# Patient Record
Sex: MALE | Race: WHITE | NOT HISPANIC OR LATINO
[De-identification: names, ages, dates, MRNs, and addresses within clinical notes are randomized per-mention and may not be internally consistent; named-entity substitution may affect disease eponyms.]

---

## 2016-01-16 RX ORDER — METOPROLOL TARTRATE 50 MG
1 TABLET ORAL
Qty: 0 | Refills: 0 | DISCHARGE
Start: 2016-01-16

## 2017-04-18 ENCOUNTER — APPOINTMENT (OUTPATIENT)
Dept: CARDIOTHORACIC SURGERY | Facility: CLINIC | Age: 67
End: 2017-04-18

## 2017-04-18 VITALS
SYSTOLIC BLOOD PRESSURE: 126 MMHG | DIASTOLIC BLOOD PRESSURE: 82 MMHG | HEIGHT: 71 IN | HEART RATE: 61 BPM | OXYGEN SATURATION: 98 %

## 2017-04-18 NOTE — COUNSELING
[Hygeine (Including Daily Shower)] : hygeine (including daily shower) [Importance of Regular Medical Follow-Up] : the importance of regular medical follow-up [No Heavy Lifting] : no heavy lifting (>15-20 lb. for 1 month or 25 lb. for 3 months from date of surgery) [Blood Pressure Control] : blood pressure control [Weight Management] : weight management [S/S of infection] : signs and symptoms of infection (and to whom it should be reported) [Progressive Ambulation/Activity] : progressive ambulation/activity [Medication/Vitamin/Herb/Food Interaction] : medication/vitamin/herb/food interaction [Stress Management] : stress management

## 2017-04-26 NOTE — CONSULT LETTER
[Dear  ___] : Dear  [unfilled], [Courtesy Letter:] : I had the pleasure of seeing your patient, [unfilled], in my office today. [Please see my note below.] : Please see my note below. [Consult Closing:] : Thank you very much for allowing me to participate in the care of this patient.  If you have any questions, please do not hesitate to contact me. [Sincerely,] : Sincerely, [FreeTextEntry2] : Dr.Jerry Garzon\[facility] [address]\[place] [address] [Tacho Giron MD] : Tacho Giron MD [Chief] : Chief [Cardiac Surgery at [facility]] : Cardiac Surgery at [facility]

## 2017-05-01 ENCOUNTER — APPOINTMENT (OUTPATIENT)
Dept: SURGERY | Facility: CLINIC | Age: 67
End: 2017-05-01

## 2017-05-01 VITALS
BODY MASS INDEX: 31.22 KG/M2 | DIASTOLIC BLOOD PRESSURE: 78 MMHG | HEIGHT: 71 IN | SYSTOLIC BLOOD PRESSURE: 127 MMHG | OXYGEN SATURATION: 98 % | WEIGHT: 223 LBS | RESPIRATION RATE: 16 BRPM | HEART RATE: 51 BPM | TEMPERATURE: 97.8 F

## 2017-05-01 DIAGNOSIS — Z84.89 FAMILY HISTORY OF OTHER SPECIFIED CONDITIONS: ICD-10-CM

## 2017-05-01 DIAGNOSIS — Z82.49 FAMILY HISTORY OF ISCHEMIC HEART DISEASE AND OTHER DISEASES OF THE CIRCULATORY SYSTEM: ICD-10-CM

## 2017-05-01 DIAGNOSIS — Z86.718 PERSONAL HISTORY OF OTHER VENOUS THROMBOSIS AND EMBOLISM: ICD-10-CM

## 2017-05-01 DIAGNOSIS — Z86.39 PERSONAL HISTORY OF OTHER ENDOCRINE, NUTRITIONAL AND METABOLIC DISEASE: ICD-10-CM

## 2017-05-15 ENCOUNTER — APPOINTMENT (OUTPATIENT)
Dept: SURGERY | Facility: CLINIC | Age: 67
End: 2017-05-15

## 2017-05-15 VITALS
SYSTOLIC BLOOD PRESSURE: 115 MMHG | TEMPERATURE: 98.3 F | OXYGEN SATURATION: 98 % | HEART RATE: 54 BPM | DIASTOLIC BLOOD PRESSURE: 71 MMHG

## 2017-08-21 ENCOUNTER — APPOINTMENT (OUTPATIENT)
Dept: SURGERY | Facility: CLINIC | Age: 67
End: 2017-08-21
Payer: MEDICARE

## 2017-08-21 VITALS
OXYGEN SATURATION: 98 % | TEMPERATURE: 99.1 F | BODY MASS INDEX: 31.92 KG/M2 | WEIGHT: 228 LBS | HEART RATE: 63 BPM | RESPIRATION RATE: 16 BRPM | SYSTOLIC BLOOD PRESSURE: 114 MMHG | HEIGHT: 71 IN | DIASTOLIC BLOOD PRESSURE: 65 MMHG

## 2017-08-21 PROCEDURE — 99213 OFFICE O/P EST LOW 20 MIN: CPT

## 2017-11-06 ENCOUNTER — APPOINTMENT (OUTPATIENT)
Dept: ORTHOPEDIC SURGERY | Facility: CLINIC | Age: 67
End: 2017-11-06
Payer: MEDICARE

## 2017-11-06 VITALS
HEART RATE: 67 BPM | WEIGHT: 228 LBS | BODY MASS INDEX: 31.92 KG/M2 | HEIGHT: 71 IN | SYSTOLIC BLOOD PRESSURE: 120 MMHG | DIASTOLIC BLOOD PRESSURE: 82 MMHG

## 2017-11-06 DIAGNOSIS — S66.902A UNSPECIFIED INJURY OF UNSPECIFIED MUSCLE, FASCIA AND TENDON AT WRIST AND HAND LEVEL, LEFT HAND, INITIAL ENCOUNTER: ICD-10-CM

## 2017-11-06 PROCEDURE — 73140 X-RAY EXAM OF FINGER(S): CPT | Mod: FA

## 2017-11-06 PROCEDURE — 99203 OFFICE O/P NEW LOW 30 MIN: CPT

## 2017-11-07 PROBLEM — S66.902A: Status: ACTIVE | Noted: 2017-11-06

## 2018-01-08 ENCOUNTER — APPOINTMENT (OUTPATIENT)
Dept: CARDIOLOGY | Facility: CLINIC | Age: 68
End: 2018-01-08
Payer: MEDICARE

## 2018-01-08 PROCEDURE — 99214 OFFICE O/P EST MOD 30 MIN: CPT

## 2018-01-08 PROCEDURE — 93000 ELECTROCARDIOGRAM COMPLETE: CPT

## 2018-02-12 ENCOUNTER — APPOINTMENT (OUTPATIENT)
Dept: SURGERY | Facility: CLINIC | Age: 68
End: 2018-02-12
Payer: MEDICARE

## 2018-02-12 VITALS
DIASTOLIC BLOOD PRESSURE: 82 MMHG | RESPIRATION RATE: 16 BRPM | OXYGEN SATURATION: 97 % | HEIGHT: 71 IN | WEIGHT: 228 LBS | SYSTOLIC BLOOD PRESSURE: 133 MMHG | HEART RATE: 60 BPM | TEMPERATURE: 97.8 F | BODY MASS INDEX: 31.92 KG/M2

## 2018-02-12 PROCEDURE — 99213 OFFICE O/P EST LOW 20 MIN: CPT

## 2018-02-26 ENCOUNTER — MEDICATION RENEWAL (OUTPATIENT)
Age: 68
End: 2018-02-26

## 2018-02-26 ENCOUNTER — RX RENEWAL (OUTPATIENT)
Age: 68
End: 2018-02-26

## 2018-04-23 ENCOUNTER — APPOINTMENT (OUTPATIENT)
Dept: CARDIOLOGY | Facility: CLINIC | Age: 68
End: 2018-04-23

## 2018-04-27 ENCOUNTER — APPOINTMENT (OUTPATIENT)
Dept: CARDIOLOGY | Facility: CLINIC | Age: 68
End: 2018-04-27

## 2018-05-14 ENCOUNTER — APPOINTMENT (OUTPATIENT)
Dept: CARDIOLOGY | Facility: CLINIC | Age: 68
End: 2018-05-14

## 2018-05-15 ENCOUNTER — APPOINTMENT (OUTPATIENT)
Dept: CARDIOLOGY | Facility: CLINIC | Age: 68
End: 2018-05-15
Payer: MEDICARE

## 2018-05-15 PROCEDURE — 93880 EXTRACRANIAL BILAT STUDY: CPT

## 2018-05-15 PROCEDURE — 93306 TTE W/DOPPLER COMPLETE: CPT

## 2018-05-21 ENCOUNTER — RECORD ABSTRACTING (OUTPATIENT)
Age: 68
End: 2018-05-21

## 2018-05-21 DIAGNOSIS — M17.10 UNILATERAL PRIMARY OSTEOARTHRITIS, UNSPECIFIED KNEE: ICD-10-CM

## 2018-05-21 DIAGNOSIS — S83.209A UNSPECIFIED TEAR OF UNSPECIFIED MENISCUS, CURRENT INJURY, UNSPECIFIED KNEE, INITIAL ENCOUNTER: ICD-10-CM

## 2018-05-21 DIAGNOSIS — Z87.898 PERSONAL HISTORY OF OTHER SPECIFIED CONDITIONS: ICD-10-CM

## 2018-05-21 DIAGNOSIS — Z86.79 PERSONAL HISTORY OF OTHER DISEASES OF THE CIRCULATORY SYSTEM: ICD-10-CM

## 2018-05-21 RX ORDER — BACILLUS COAGULANS/INULIN 1B-250 MG
CAPSULE ORAL
Refills: 0 | Status: ACTIVE | COMMUNITY

## 2018-05-21 RX ORDER — HYDROCODONE BITARTRATE AND ACETAMINOPHEN 7.5; 325 MG/1; MG/1
7.5-325 TABLET ORAL
Qty: 30 | Refills: 0 | Status: COMPLETED | COMMUNITY
Start: 2018-01-29

## 2018-05-21 RX ORDER — SILDENAFIL CITRATE 100 MG/1
100 TABLET, FILM COATED ORAL
Qty: 6 | Refills: 0 | Status: DISCONTINUED | COMMUNITY
Start: 2016-12-27 | End: 2018-05-21

## 2018-05-21 RX ORDER — METOPROLOL TARTRATE 100 MG/1
100 TABLET, FILM COATED ORAL
Refills: 0 | Status: DISCONTINUED | COMMUNITY
End: 2018-05-21

## 2018-05-23 ENCOUNTER — APPOINTMENT (OUTPATIENT)
Dept: CARDIOLOGY | Facility: CLINIC | Age: 68
End: 2018-05-23
Payer: MEDICARE

## 2018-05-23 VITALS
SYSTOLIC BLOOD PRESSURE: 129 MMHG | WEIGHT: 225 LBS | HEIGHT: 71 IN | DIASTOLIC BLOOD PRESSURE: 80 MMHG | BODY MASS INDEX: 31.5 KG/M2 | RESPIRATION RATE: 16 BRPM | HEART RATE: 64 BPM

## 2018-05-23 DIAGNOSIS — S53.22XS: ICD-10-CM

## 2018-05-23 PROCEDURE — 99214 OFFICE O/P EST MOD 30 MIN: CPT

## 2018-05-23 PROCEDURE — 93000 ELECTROCARDIOGRAM COMPLETE: CPT

## 2018-06-14 ENCOUNTER — RX RENEWAL (OUTPATIENT)
Age: 68
End: 2018-06-14

## 2018-08-13 ENCOUNTER — APPOINTMENT (OUTPATIENT)
Dept: SURGERY | Facility: CLINIC | Age: 68
End: 2018-08-13
Payer: MEDICARE

## 2018-08-13 VITALS
BODY MASS INDEX: 32.76 KG/M2 | HEIGHT: 71 IN | DIASTOLIC BLOOD PRESSURE: 84 MMHG | SYSTOLIC BLOOD PRESSURE: 132 MMHG | OXYGEN SATURATION: 96 % | TEMPERATURE: 97.9 F | WEIGHT: 234 LBS | HEART RATE: 57 BPM | RESPIRATION RATE: 16 BRPM

## 2018-08-13 PROCEDURE — 99214 OFFICE O/P EST MOD 30 MIN: CPT

## 2018-09-27 ENCOUNTER — APPOINTMENT (OUTPATIENT)
Dept: CARDIOLOGY | Facility: CLINIC | Age: 68
End: 2018-09-27
Payer: MEDICARE

## 2018-09-27 VITALS
SYSTOLIC BLOOD PRESSURE: 123 MMHG | HEART RATE: 72 BPM | BODY MASS INDEX: 32.2 KG/M2 | DIASTOLIC BLOOD PRESSURE: 80 MMHG | RESPIRATION RATE: 15 BRPM | HEIGHT: 71 IN | WEIGHT: 230 LBS

## 2018-09-27 PROCEDURE — 99214 OFFICE O/P EST MOD 30 MIN: CPT

## 2018-09-27 PROCEDURE — 93000 ELECTROCARDIOGRAM COMPLETE: CPT

## 2018-10-04 ENCOUNTER — APPOINTMENT (OUTPATIENT)
Dept: DERMATOLOGY | Facility: CLINIC | Age: 68
End: 2018-10-04
Payer: MEDICARE

## 2018-10-04 PROCEDURE — 99213 OFFICE O/P EST LOW 20 MIN: CPT

## 2018-12-11 ENCOUNTER — APPOINTMENT (OUTPATIENT)
Dept: CARDIOLOGY | Facility: CLINIC | Age: 68
End: 2018-12-11
Payer: MEDICARE

## 2018-12-11 PROCEDURE — 93015 CV STRESS TEST SUPVJ I&R: CPT

## 2018-12-11 PROCEDURE — A9500: CPT

## 2018-12-11 PROCEDURE — 78452 HT MUSCLE IMAGE SPECT MULT: CPT

## 2018-12-11 RX ORDER — REGADENOSON 0.08 MG/ML
0.4 INJECTION, SOLUTION INTRAVENOUS
Qty: 1 | Refills: 0 | Status: COMPLETED | OUTPATIENT
Start: 2018-12-11

## 2018-12-11 RX ADMIN — REGADENOSON 0 MG/5ML: 0.08 INJECTION, SOLUTION INTRAVENOUS at 00:00

## 2018-12-21 RX ORDER — KIT FOR THE PREPARATION OF TECHNETIUM TC99M SESTAMIBI 1 MG/5ML
INJECTION, POWDER, LYOPHILIZED, FOR SOLUTION PARENTERAL
Refills: 0 | Status: COMPLETED | OUTPATIENT
Start: 2018-12-21

## 2018-12-21 RX ADMIN — KIT FOR THE PREPARATION OF TECHNETIUM TC99M SESTAMIBI 0: 1 INJECTION, POWDER, LYOPHILIZED, FOR SOLUTION PARENTERAL at 00:00

## 2019-01-22 ENCOUNTER — APPOINTMENT (OUTPATIENT)
Dept: CARDIOLOGY | Facility: CLINIC | Age: 69
End: 2019-01-22
Payer: MEDICARE

## 2019-01-22 ENCOUNTER — NON-APPOINTMENT (OUTPATIENT)
Age: 69
End: 2019-01-22

## 2019-01-22 VITALS
RESPIRATION RATE: 14 BRPM | WEIGHT: 239 LBS | DIASTOLIC BLOOD PRESSURE: 76 MMHG | BODY MASS INDEX: 33.46 KG/M2 | SYSTOLIC BLOOD PRESSURE: 124 MMHG | HEIGHT: 71 IN

## 2019-01-22 PROCEDURE — 99214 OFFICE O/P EST MOD 30 MIN: CPT

## 2019-01-22 PROCEDURE — 93000 ELECTROCARDIOGRAM COMPLETE: CPT

## 2019-02-11 ENCOUNTER — APPOINTMENT (OUTPATIENT)
Dept: SURGERY | Facility: CLINIC | Age: 69
End: 2019-02-11
Payer: MEDICARE

## 2019-02-11 VITALS
DIASTOLIC BLOOD PRESSURE: 70 MMHG | WEIGHT: 241 LBS | OXYGEN SATURATION: 99 % | RESPIRATION RATE: 14 BRPM | TEMPERATURE: 98.7 F | HEART RATE: 67 BPM | BODY MASS INDEX: 33.74 KG/M2 | HEIGHT: 71 IN | SYSTOLIC BLOOD PRESSURE: 113 MMHG

## 2019-02-11 PROCEDURE — 99213 OFFICE O/P EST LOW 20 MIN: CPT

## 2019-02-11 NOTE — PHYSICAL EXAM
[JVD] : no jugular venous distention  [No Rash or Lesion] : No rash or lesion [Purpura] : no purpura  [Petechiae] : no petechiae [Skin Ulcer] : no ulcer [Skin Induration] : no induration [Alert] : alert [Oriented to Person] : oriented to person [Oriented to Place] : oriented to place [Oriented to Time] : oriented to time [Calm] : calm [de-identified] : non toxic, in no acute distress [de-identified] : NC/AT PERRL EOMI no scleral icterus [de-identified] : trachea midline with no gross masses  [de-identified] : no audible wheezing or stridor [de-identified] : soft, mildly obese with no localizing tenderness, no guarding, no rebound, no masses [de-identified] : FROM of all extremities, no gross angulation or deformity, there remains a 2 cm reducible subxiphoid incisional hernia that exhibits no growth since the last evaluation and it remains soft and non tender [de-identified] : sternotomy scar consistent with prior cardiac surgery [de-identified] : mood is calm

## 2019-02-11 NOTE — HISTORY OF PRESENT ILLNESS
[de-identified] : The patient returns to the office with no complaints of pain, no nausea, and no vomit.  The patient at this point has noted no significant change in the hernia.  He reports that it will pop out with exertion and reduce upon relaxation.

## 2019-02-11 NOTE — ASSESSMENT
[FreeTextEntry1] : The patient is a 68 year old obese male with a stable subxiphoid incisional hernia.  The patient at this time is asymptomatic and wishes to continue with conservative management.   The patient was educated about the signs and symptoms of hernia strangulation and that should this occur they should seek immediate MD evaluation.\par

## 2019-05-09 ENCOUNTER — RX RENEWAL (OUTPATIENT)
Age: 69
End: 2019-05-09

## 2019-05-28 ENCOUNTER — APPOINTMENT (OUTPATIENT)
Dept: CARDIOLOGY | Facility: CLINIC | Age: 69
End: 2019-05-28

## 2019-05-30 ENCOUNTER — APPOINTMENT (OUTPATIENT)
Dept: CARDIOLOGY | Facility: CLINIC | Age: 69
End: 2019-05-30
Payer: MEDICARE

## 2019-05-30 ENCOUNTER — NON-APPOINTMENT (OUTPATIENT)
Age: 69
End: 2019-05-30

## 2019-05-30 VITALS
HEIGHT: 71 IN | RESPIRATION RATE: 14 BRPM | HEART RATE: 58 BPM | DIASTOLIC BLOOD PRESSURE: 78 MMHG | WEIGHT: 238 LBS | SYSTOLIC BLOOD PRESSURE: 122 MMHG | BODY MASS INDEX: 33.32 KG/M2

## 2019-05-30 DIAGNOSIS — E66.9 OBESITY, UNSPECIFIED: ICD-10-CM

## 2019-05-30 PROCEDURE — 93000 ELECTROCARDIOGRAM COMPLETE: CPT

## 2019-05-30 PROCEDURE — 99214 OFFICE O/P EST MOD 30 MIN: CPT

## 2019-05-30 NOTE — HISTORY OF PRESENT ILLNESS
[FreeTextEntry1] : He continues to have additional somatic complaints such as chronic arthritides of the knee, he continues to get additional "gel shots" which helps alleviate his knee pain.\par \par Patient is tolerating cardiac medications without negative side effects including Metoprolol Succinate 100 mg QD, Crestor 10 mg QHS, and ASA 81 mg QD.\par

## 2019-05-30 NOTE — DISCUSSION/SUMMARY
[FreeTextEntry1] : 1).  Patient is tolerating cardiac medications without negative side effects, continue with current cardiac medication regimen (refer above).\par \par 2).  Follow up with PCP (Dr. Calderon) regarding routine checkups and blood work, have copy faxed to our office. \par \par 3).  Counseled patient to continue with diet and lifestyle modification including low fat and low carbohydrate weight reducing diet.  Continue to implement aerobic exercise 4 to 5 days per week. \par \par 4).  No additional cardiac testing indicated at this time. \par \par 5).  Recommend patient report any untoward symptoms. \par \par 6).  Follow up with our office in 3 to 4 months or PRN.

## 2019-05-30 NOTE — PHYSICAL EXAM
[Normal Appearance] : normal appearance [General Appearance - In No Acute Distress] : no acute distress [Normal Conjunctiva] : the conjunctiva exhibited no abnormalities [Normal Oral Mucosa] : normal oral mucosa [Normal Jugular Venous A Waves Present] : normal jugular venous A waves present [Normal Jugular Venous V Waves Present] : normal jugular venous V waves present [No Jugular Venous Guzman A Waves] : no jugular venous guzman A waves [] : no respiratory distress [Respiration, Rhythm And Depth] : normal respiratory rhythm and effort [Auscultation Breath Sounds / Voice Sounds] : lungs were clear to auscultation bilaterally [Heart Rate And Rhythm] : heart rate and rhythm were normal [Heart Sounds] : normal S1 and S2 [Edema] : no peripheral edema present [FreeTextEntry1] : Grade I/VI to II/VI systolic murmur, well-healed surgical scar in midline  [Bowel Sounds] : normal bowel sounds [Abnormal Walk] : normal gait [Nail Clubbing] : no clubbing of the fingernails [Cyanosis, Localized] : no localized cyanosis [Skin Color & Pigmentation] : normal skin color and pigmentation [Skin Turgor] : normal skin turgor [Oriented To Time, Place, And Person] : oriented to person, place, and time [Impaired Insight] : insight and judgment were intact [Affect] : the affect was normal

## 2019-05-30 NOTE — ASSESSMENT
[FreeTextEntry1] : EKG 5/30/2019:  The EKG illustrates sinus bradycardia, rate of 58, slight left axis deviation.  Essentially unchanged.\par \par Most recent blood work (January 2019):  Cholesterol (127), LDL (50), HDL (48), Triglycerides (144).\par \par Most recent Nuclear Stress test (December 2018) demonstrated negative EKG for ischemia, normal myocardial perfusion imaging, normal LV function with an EF of 65%, prior extensive ischemia no longer seen when compared to previous testing.\par \par Most recent Transthoracic Echocardiogram (May 2018) illustrates normal LV function with an EF of 60 to 65%, mildly dilated ascending aorta, mild LVH, mild valvulopathy.

## 2019-05-30 NOTE — REASON FOR VISIT
[FreeTextEntry1] : The patient is a 68-year-old white male with known history for ischemic heart disease and prior history of multivessel CABG (January 2016), valvular insufficiency, and obesity. Mr. Hudson presents back to the office today for general cardiac checkup.  He reports feeling overall well and without cardiac complaints.  The patient denies CP, SOB, MOSELEY, PND, orthopnea, palpitations, presyncope, syncope.

## 2019-08-07 ENCOUNTER — RX RENEWAL (OUTPATIENT)
Age: 69
End: 2019-08-07

## 2019-08-12 ENCOUNTER — APPOINTMENT (OUTPATIENT)
Dept: SURGERY | Facility: CLINIC | Age: 69
End: 2019-08-12

## 2019-08-12 ENCOUNTER — EMERGENCY (EMERGENCY)
Facility: HOSPITAL | Age: 69
LOS: 0 days | Discharge: HOME | End: 2019-08-12
Attending: EMERGENCY MEDICINE
Payer: MEDICARE

## 2019-08-12 VITALS
RESPIRATION RATE: 16 BRPM | TEMPERATURE: 98 F | HEART RATE: 68 BPM | SYSTOLIC BLOOD PRESSURE: 118 MMHG | OXYGEN SATURATION: 97 % | DIASTOLIC BLOOD PRESSURE: 75 MMHG

## 2019-08-12 VITALS
RESPIRATION RATE: 16 BRPM | SYSTOLIC BLOOD PRESSURE: 117 MMHG | DIASTOLIC BLOOD PRESSURE: 76 MMHG | TEMPERATURE: 98 F | OXYGEN SATURATION: 100 % | HEART RATE: 72 BPM
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
## 2019-08-12 DIAGNOSIS — Z98.89 OTHER SPECIFIED POSTPROCEDURAL STATES: Chronic | ICD-10-CM

## 2019-08-12 DIAGNOSIS — S61.213A LACERATION WITHOUT FOREIGN BODY OF LEFT MIDDLE FINGER WITHOUT DAMAGE TO NAIL, INITIAL ENCOUNTER: ICD-10-CM
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
## 2019-08-12 DIAGNOSIS — Z86.711 PERSONAL HISTORY OF PULMONARY EMBOLISM: ICD-10-CM

## 2019-08-12 DIAGNOSIS — I10 ESSENTIAL (PRIMARY) HYPERTENSION: ICD-10-CM

## 2019-08-12 DIAGNOSIS — E03.9 HYPOTHYROIDISM, UNSPECIFIED: ICD-10-CM

## 2019-08-12 DIAGNOSIS — Z79.82 LONG TERM (CURRENT) USE OF ASPIRIN: ICD-10-CM

## 2019-08-12 PROCEDURE — 99284 EMERGENCY DEPT VISIT MOD MDM: CPT

## 2019-08-12 PROCEDURE — 13152 CMPLX RPR E/N/E/L 2.6-7.5 CM: CPT

## 2019-08-12 PROCEDURE — 99285 EMERGENCY DEPT VISIT HI MDM: CPT | Mod: 25

## 2019-08-12 PROCEDURE — 96374 THER/PROPH/DIAG INJ IV PUSH: CPT | Mod: XU

## 2019-08-12 RX ORDER — CEFAZOLIN SODIUM 1 G
2000 VIAL (EA) INJECTION ONCE
Refills: 0 | Status: COMPLETED | OUTPATIENT
Start: 2019-08-12 | End: 2019-08-12

## 2019-08-12 RX ADMIN — Medication 2000 MILLIGRAM(S): at 17:58

## 2019-08-12 NOTE — ED ADULT NURSE NOTE - OBJECTIVE STATEMENT
pt presents to the ED s/p laceration to right middle finger. States his hands were on a fence when a dog jumped up and his nail cut pt's left middle finger. Tetanus UTD. No other acute complaints at this time

## 2019-08-12 NOTE — ED STATDOCS - OBJECTIVE STATEMENT
69 y/o male with PMHx of HTN, PE, hypothyroid presents to the ED s/p laceration to right middle finger. States his hands were on a fence when a dog jumped up and his nail cut pt's left middle finger. Tetanus UTD. No other acute complaints at this time.

## 2019-08-12 NOTE — ED ADULT TRIAGE NOTE - CHIEF COMPLAINT QUOTE
Patient presents with dog scratch to finger, patient went to urgent care and was told to come to ER to meet Dr Ornelas. Patient is on Eliquis

## 2019-08-12 NOTE — ED STATDOCS - NSFOLLOWUPINSTRUCTIONS_ED_ALL_ED_FT
Laceration    WHAT YOU NEED TO KNOW:    A laceration is an injury to the skin and the soft tissue underneath it. Lacerations happen when you are cut or hit by something. They can happen anywhere on the body.     DISCHARGE INSTRUCTIONS:    Return to the emergency department if:     You have heavy bleeding or bleeding that does not stop after 10 minutes of holding firm, direct pressure over the wound.       Your wound opens up.     Contact your healthcare provider if:     You have a fever or chills.       Your laceration is red, warm, or swollen.      You have red streaks on your skin coming from your wound.      You have white or yellow drainage from the wound that smells bad.      You have pain that gets worse, even after treatment.       You have questions or concerns about your condition or care.     Medicines:     Prescription pain medicine may be given. Ask how to take this medicine safely.       Antibiotics help treat or prevent a bacterial infection.       Take your medicine as directed. Contact your healthcare provider if you think your medicine is not helping or if you have side effects. Tell him or her if you are allergic to any medicine. Keep a list of the medicines, vitamins, and herbs you take. Include the amounts, and when and why you take them. Bring the list or the pill bottles to follow-up visits. Carry your medicine list with you in case of an emergency.    Care for your wound as directed:     Do not get your wound wet until your healthcare provider says it is okay. Do not soak your wound in water. Do not go swimming until your healthcare provider says it is okay. Carefully wash the wound with soap and water. Gently pat the area dry or allow it to air dry.       Change your bandages when they get wet, dirty, or after washing. Apply new, clean bandages as directed. Do not apply elastic bandages or tape too tight. Do not put powders or lotions over your incision.       Apply antibiotic ointment as directed. Your healthcare provider may give you antibiotic ointment to put over your wound if you have stitches. If you have strips of tape over your incision, let them dry up and fall off on their own. If they do not fall off within 14 days, gently remove them. If you have glue over your wound, do not remove or pick at it. If your glue comes off, do not replace it with glue that you have at home.       Check your wound every day for signs of infection such as swelling, redness, or pus.     Self-care:     Apply ice on your wound for 15 to 20 minutes every hour or as directed. Use an ice pack, or put crushed ice in a plastic bag. Cover it with a towel. Ice helps prevent tissue damage and decreases swelling and pain.      Use a splint as directed. A splint will decrease movement and stress on your wound. It may help it heal faster. A splint may be used for lacerations over joints or areas of your body that bend. Ask your healthcare provider how to apply and remove a splint.       Decrease scarring of your wound by applying ointments as directed. Do not apply ointments until your healthcare provider says it is okay. You may need to wait until your wound is healed. Ask which ointment to buy and how often to use it. After your wound is healed, use sunscreen over the area when you are out in the sun. You should do this for at least 6 months to 1 year after your injury.     Follow up with your healthcare provider as directed: You may need to follow up in 24 to 48 hours to have your wound checked for infection. You will need to return in 3 to 14 days if you have stitches or staples so they can be removed. Care for your wound as directed to prevent infection and help it heal. Write down your questions so you remember to ask them during your visits.

## 2019-08-12 NOTE — ED STATDOCS - CARE PROVIDER_API CALL
Guero Carrillo)  Plastic Surgery  [address], [address], [place] [address]  Phone: [phone]  Fax: [phone]  Follow Up Time:

## 2019-08-12 NOTE — ED STATDOCS - PROGRESS NOTE DETAILS
YANIRA Esparza:   Patient has been seen, evaluated and orders have been written by the attending in intake. Patient is stable.  I will follow up the results of orders written and I will continue to evaluate/observe the patient.   Aiyana Esparza PA-C Pt reports R hand dominant.  Lac sustained while reaching over neighbor fence to set up ladder, dog's paw /nail scratched or ripped skin on left 3rd finger.  Denies bite wound.  Was repaired by Dr. Ornelas.  Augmentin rx was sent to pharmacy already by Urgent Care.  Pt will  and cont tonight.  Discussed elevating hand, Tylenol for pain in light of eliquis.  Td updated in .  F/U with Dr. ornelas.  will call office tomorrow for appt.  Aiyana Esparza PA-C

## 2019-08-12 NOTE — ED STATDOCS - CLINICAL SUMMARY MEDICAL DECISION MAKING FREE TEXT BOX
Pt with finger laceration.  Intact tendons on exam.  XR unremarkable.  Repaired in ED.  D/c home f/u for suture removal. Pt with finger laceration.  Intact tendons on exam.  XR unremarkable as outpatient.  Repaired in ED by Dr. Ornelas.  D/c home f/u for suture removal as scheduled.

## 2019-10-01 ENCOUNTER — NON-APPOINTMENT (OUTPATIENT)
Age: 69
End: 2019-10-01

## 2019-10-01 ENCOUNTER — APPOINTMENT (OUTPATIENT)
Dept: CARDIOLOGY | Facility: CLINIC | Age: 69
End: 2019-10-01
Payer: MEDICARE

## 2019-10-01 VITALS
DIASTOLIC BLOOD PRESSURE: 74 MMHG | HEART RATE: 58 BPM | SYSTOLIC BLOOD PRESSURE: 130 MMHG | WEIGHT: 240 LBS | BODY MASS INDEX: 33.6 KG/M2 | RESPIRATION RATE: 14 BRPM | HEIGHT: 71 IN

## 2019-10-01 DIAGNOSIS — Z86.79 PERSONAL HISTORY OF OTHER DISEASES OF THE CIRCULATORY SYSTEM: ICD-10-CM

## 2019-10-01 PROCEDURE — 93000 ELECTROCARDIOGRAM COMPLETE: CPT

## 2019-10-01 PROCEDURE — 99214 OFFICE O/P EST MOD 30 MIN: CPT

## 2019-10-01 RX ORDER — ASPIRIN 81 MG
81 TABLET, DELAYED RELEASE (ENTERIC COATED) ORAL DAILY
Refills: 0 | Status: DISCONTINUED | COMMUNITY
End: 2019-10-01

## 2019-10-01 NOTE — ASSESSMENT
[FreeTextEntry1] : EKG demonstrates normal sinus rhythm with a moderate rate of 58 and nonspecific T-wave flattening laterally\par \par In summary the patient is a 68-year-old gentleman with prior history of multivessel CABG, known peripheral venous insufficiency of the lower extremities (left greater than right) and now recent recurrent left leg DVT being managed by vascular surgery and currently on anticoagulation with improvement\par \par Plan:\par \par Patient likely will need long-term anticoagulation\par \par Recommend continue current medical regimen\par \par No additional cardiac workup indicated at this time;\par \par Followup to this office within 3-4 months or p.r.n.\par \par Timely checkups with primary care and encouraged;;;;;;

## 2019-10-01 NOTE — REASON FOR VISIT
[Follow-Up - Clinic] : a clinic follow-up of [FreeTextEntry1] : The patient is a 68-year-old white male with known history for coronary artery disease and prior history of multivessel CABG (2016) who presents back to the office for general cardiac checkup today\par \par Patient has been doing well from a cardiac standpoint however, unfortunately he was recently in the hospital for acute swelling of the left leg and found to have a large DVT in the left popliteal vein extending into the left calf;\par \par He was treated with anticoagulation with Eliquis 5 mg b.i.d. and followed by Dr. Zenon Do from the vascular surgical standpoint;;;

## 2019-10-01 NOTE — PHYSICAL EXAM
[General Appearance - Well Developed] : well developed [Normal Appearance] : normal appearance [Well Groomed] : well groomed [General Appearance - Well Nourished] : well nourished [No Deformities] : no deformities [General Appearance - In No Acute Distress] : no acute distress [Normal Conjunctiva] : the conjunctiva exhibited no abnormalities [Eyelids - No Xanthelasma] : the eyelids demonstrated no xanthelasmas [Normal Oral Mucosa] : normal oral mucosa [No Oral Pallor] : no oral pallor [No Oral Cyanosis] : no oral cyanosis [Normal Jugular Venous A Waves Present] : normal jugular venous A waves present [Normal Jugular Venous V Waves Present] : normal jugular venous V waves present [No Jugular Venous Guzman A Waves] : no jugular venous guzman A waves [Respiration, Rhythm And Depth] : normal respiratory rhythm and effort [Exaggerated Use Of Accessory Muscles For Inspiration] : no accessory muscle use [Auscultation Breath Sounds / Voice Sounds] : lungs were clear to auscultation bilaterally [Abdomen Soft] : soft [Abdomen Tenderness] : non-tender [Abdomen Mass (___ Cm)] : no abdominal mass palpated [Abnormal Walk] : normal gait [Gait - Sufficient For Exercise Testing] : the gait was sufficient for exercise testing [Nail Clubbing] : no clubbing of the fingernails [Cyanosis, Localized] : no localized cyanosis [FreeTextEntry1] : Left lower extremity swelling noted [Skin Color & Pigmentation] : normal skin color and pigmentation [] : no rash [No Venous Stasis] : no venous stasis [Skin Lesions] : no skin lesions [No Skin Ulcers] : no skin ulcer [No Xanthoma] : no  xanthoma was observed [Oriented To Time, Place, And Person] : oriented to person, place, and time [Affect] : the affect was normal [Mood] : the mood was normal [No Anxiety] : not feeling anxious

## 2020-01-31 ENCOUNTER — RX RENEWAL (OUTPATIENT)
Age: 70
End: 2020-01-31

## 2020-02-17 ENCOUNTER — NON-APPOINTMENT (OUTPATIENT)
Age: 70
End: 2020-02-17

## 2020-02-17 ENCOUNTER — APPOINTMENT (OUTPATIENT)
Dept: CARDIOLOGY | Facility: CLINIC | Age: 70
End: 2020-02-17
Payer: MEDICARE

## 2020-02-17 VITALS
SYSTOLIC BLOOD PRESSURE: 123 MMHG | BODY MASS INDEX: 34.02 KG/M2 | WEIGHT: 243 LBS | RESPIRATION RATE: 14 BRPM | HEIGHT: 71 IN | HEART RATE: 58 BPM | DIASTOLIC BLOOD PRESSURE: 74 MMHG

## 2020-02-17 DIAGNOSIS — Z87.19 PERSONAL HISTORY OF OTHER DISEASES OF THE DIGESTIVE SYSTEM: ICD-10-CM

## 2020-02-17 DIAGNOSIS — Z86.79 PERSONAL HISTORY OF OTHER DISEASES OF THE CIRCULATORY SYSTEM: ICD-10-CM

## 2020-02-17 PROCEDURE — 99214 OFFICE O/P EST MOD 30 MIN: CPT

## 2020-02-17 PROCEDURE — 93000 ELECTROCARDIOGRAM COMPLETE: CPT

## 2020-02-17 RX ORDER — APIXABAN 5 MG/1
5 TABLET, FILM COATED ORAL
Refills: 0 | Status: DISCONTINUED | COMMUNITY
End: 2020-02-17

## 2020-02-17 NOTE — HISTORY OF PRESENT ILLNESS
[FreeTextEntry1] : He is otherwise tolerating his current medication without difficulty.\par \par Last nuclear stress test was December 2018 and was negative for ischemia;\par \par

## 2020-02-17 NOTE — ASSESSMENT
[FreeTextEntry1] : EKG shows normal sinus rhythm at a rate of 58 with slight left axis deviation and otherwise no acute changes\par \par In summary the patient is a 69-year-old gentleman with known history for coronary disease and stable cardiac pattern at this time who has been on anticoagulation for history of DVT and remains on such although lower dose Eliquis;\par \par Plan:\par \par No additional cardiac workup indicated at this time;\par \par Followup to this office within 4 months or p.r.n.;\par \par Reviewed recent favorable lab results on lipid profile with improving triglycerides;\par \par Continue timely checkups and laboratory blood tests with primary care;;

## 2020-02-17 NOTE — REASON FOR VISIT
[Follow-Up - Clinic] : a clinic follow-up of [FreeTextEntry1] : The patient is a 69-year-old white male with a known history for multivessel CABG (2016) who presents back to the office today for general cardiac checkup;\par \par Patient reports that he had been on anticoagulation with Eliquis for history of DVT in the left leg and now had the medication tapered down to 2.5 mg b.i.d. for now (hematology-Dr. Sosa);\par \par Otherwise, he has been largely asymptomatic for chest pain, shortness of breath, palpitations or dizziness;

## 2020-02-17 NOTE — PHYSICAL EXAM
[General Appearance - Well Developed] : well developed [Normal Appearance] : normal appearance [Well Groomed] : well groomed [General Appearance - Well Nourished] : well nourished [No Deformities] : no deformities [General Appearance - In No Acute Distress] : no acute distress [Normal Conjunctiva] : the conjunctiva exhibited no abnormalities [Eyelids - No Xanthelasma] : the eyelids demonstrated no xanthelasmas [Normal Oral Mucosa] : normal oral mucosa [No Oral Pallor] : no oral pallor [No Oral Cyanosis] : no oral cyanosis [Normal Jugular Venous A Waves Present] : normal jugular venous A waves present [Normal Jugular Venous V Waves Present] : normal jugular venous V waves present [No Jugular Venous Guzman A Waves] : no jugular venous guzman A waves [Respiration, Rhythm And Depth] : normal respiratory rhythm and effort [Exaggerated Use Of Accessory Muscles For Inspiration] : no accessory muscle use [Auscultation Breath Sounds / Voice Sounds] : lungs were clear to auscultation bilaterally [Abdomen Soft] : soft [Abdomen Tenderness] : non-tender [Abdomen Mass (___ Cm)] : no abdominal mass palpated [Nail Clubbing] : no clubbing of the fingernails [Gait - Sufficient For Exercise Testing] : the gait was sufficient for exercise testing [Abnormal Walk] : normal gait [Cyanosis, Localized] : no localized cyanosis [FreeTextEntry1] : Left lower extremity swelling noted [Skin Color & Pigmentation] : normal skin color and pigmentation [] : no rash [No Venous Stasis] : no venous stasis [Skin Lesions] : no skin lesions [No Skin Ulcers] : no skin ulcer [No Xanthoma] : no  xanthoma was observed [Oriented To Time, Place, And Person] : oriented to person, place, and time [Affect] : the affect was normal [Mood] : the mood was normal [No Anxiety] : not feeling anxious

## 2020-04-17 ENCOUNTER — RX RENEWAL (OUTPATIENT)
Age: 70
End: 2020-04-17

## 2020-06-24 ENCOUNTER — NON-APPOINTMENT (OUTPATIENT)
Age: 70
End: 2020-06-24

## 2020-06-24 ENCOUNTER — APPOINTMENT (OUTPATIENT)
Dept: CARDIOLOGY | Facility: CLINIC | Age: 70
End: 2020-06-24
Payer: MEDICARE

## 2020-06-24 VITALS
SYSTOLIC BLOOD PRESSURE: 140 MMHG | HEIGHT: 71 IN | DIASTOLIC BLOOD PRESSURE: 70 MMHG | HEART RATE: 57 BPM | BODY MASS INDEX: 32.2 KG/M2 | RESPIRATION RATE: 12 BRPM | WEIGHT: 230 LBS | TEMPERATURE: 98 F

## 2020-06-24 PROCEDURE — 93000 ELECTROCARDIOGRAM COMPLETE: CPT

## 2020-06-24 PROCEDURE — 99215 OFFICE O/P EST HI 40 MIN: CPT

## 2020-06-24 RX ORDER — ROSUVASTATIN CALCIUM 10 MG/1
10 TABLET, FILM COATED ORAL
Qty: 90 | Refills: 3 | Status: DISCONTINUED | COMMUNITY
Start: 2018-05-23 | End: 2020-06-24

## 2020-06-24 NOTE — HISTORY OF PRESENT ILLNESS
[FreeTextEntry1] : He doesn't feel he has any overt anxiety but there is stress in the house at times;\par \par States he's been otherwise following safety guidelines during the corona virus pandemic;

## 2020-06-24 NOTE — REASON FOR VISIT
[Follow-Up - Clinic] : a clinic follow-up of [FreeTextEntry1] : The patient is a 69-year-old white male with known history of ischemic heart disease and prior history for multivessel CABG dating back to 2016;\par \par Patient presents in followup to the office today and reports that he's been noting occasional feelings of shortness of breath somehow mostly at rest and not even particularly exertional related;\par \par He is able to mow the lawn at his house and do his daughters yard and generally feel well;\par \par Yet he feels like sometimes he can't take a deep breath there has a slight heaviness in his chest and breathing;\par \par O2 saturations at home have been in the 90s without difficulty;\par \par He denies PND or orthopnea, palpitations or dizziness;

## 2020-06-24 NOTE — REVIEW OF SYSTEMS
[Shortness Of Breath] : shortness of breath [Chest  Pressure] : chest pressure [Negative] : Heme/Lymph

## 2020-06-24 NOTE — PHYSICAL EXAM
[General Appearance - Well Developed] : well developed [Normal Appearance] : normal appearance [Well Groomed] : well groomed [General Appearance - Well Nourished] : well nourished [No Deformities] : no deformities [Normal Conjunctiva] : the conjunctiva exhibited no abnormalities [General Appearance - In No Acute Distress] : no acute distress [Eyelids - No Xanthelasma] : the eyelids demonstrated no xanthelasmas [No Oral Pallor] : no oral pallor [Normal Oral Mucosa] : normal oral mucosa [Normal Jugular Venous A Waves Present] : normal jugular venous A waves present [No Oral Cyanosis] : no oral cyanosis [Normal Jugular Venous V Waves Present] : normal jugular venous V waves present [No Jugular Venous Guzman A Waves] : no jugular venous guzman A waves [Auscultation Breath Sounds / Voice Sounds] : lungs were clear to auscultation bilaterally [Respiration, Rhythm And Depth] : normal respiratory rhythm and effort [Exaggerated Use Of Accessory Muscles For Inspiration] : no accessory muscle use [Abdomen Soft] : soft [Abdomen Mass (___ Cm)] : no abdominal mass palpated [Abdomen Tenderness] : non-tender [Abnormal Walk] : normal gait [Gait - Sufficient For Exercise Testing] : the gait was sufficient for exercise testing [Nail Clubbing] : no clubbing of the fingernails [Cyanosis, Localized] : no localized cyanosis [FreeTextEntry1] : Left lower extremity swelling noted [Skin Color & Pigmentation] : normal skin color and pigmentation [No Venous Stasis] : no venous stasis [Skin Lesions] : no skin lesions [] : no rash [No Skin Ulcers] : no skin ulcer [No Xanthoma] : no  xanthoma was observed [Mood] : the mood was normal [Oriented To Time, Place, And Person] : oriented to person, place, and time [Affect] : the affect was normal [No Anxiety] : not feeling anxious

## 2020-06-24 NOTE — ASSESSMENT
[FreeTextEntry1] : EKG demonstrating sinus bradycardia at a rate of 57 with leftward axis deviation and otherwise no acute changes;\par \par In summary the patient is a 69-year-old gentleman with history for CAD and multivessel CABG with some recent feelings of shortness of breath and atypical chest heaviness. He has a known history of carotid plaquing stenosis;\par \par \par \par Plan:\par \par Recommend patient empirically decrease metoprolol succinate 250 mg p.o. daily;\par \par Schedule carotid duplex study and echocardiogram to assess carotid plaquing stenosis and cardiac function\par \par Recommend nuclear stress test with pharmacologic protocol to rule out any significant coronary ischemia in the near future\par \par Followup office visit within 3-4 months or p.r.n. as well;\par \par Continue other medications the same including low dose Eliquis which he has been taking since he had DVT several months ago--(to followup with vascular surgery and hematology as well);;

## 2020-07-21 ENCOUNTER — RX RENEWAL (OUTPATIENT)
Age: 70
End: 2020-07-21

## 2020-07-24 ENCOUNTER — APPOINTMENT (OUTPATIENT)
Dept: CARDIOLOGY | Facility: CLINIC | Age: 70
End: 2020-07-24
Payer: MEDICARE

## 2020-07-24 PROCEDURE — 78452 HT MUSCLE IMAGE SPECT MULT: CPT

## 2020-07-24 PROCEDURE — 93015 CV STRESS TEST SUPVJ I&R: CPT

## 2020-07-24 PROCEDURE — A9500: CPT

## 2020-07-24 RX ADMIN — REGADENOSON 0 MG/5ML: 0.08 INJECTION, SOLUTION INTRAVENOUS at 00:00

## 2020-07-24 RX ADMIN — AMINOPHYLLINE 0 MG/ML: 25 INJECTION, SOLUTION INTRAVENOUS at 00:00

## 2020-07-28 ENCOUNTER — APPOINTMENT (OUTPATIENT)
Dept: CARDIOLOGY | Facility: CLINIC | Age: 70
End: 2020-07-28
Payer: MEDICARE

## 2020-07-28 PROCEDURE — ZZZZZ: CPT

## 2020-07-29 RX ORDER — AMINOPHYLLINE 25 MG/ML
25 INJECTION, SOLUTION INTRAVENOUS
Qty: 0 | Refills: 0 | Status: COMPLETED | OUTPATIENT
Start: 2020-07-24

## 2020-07-29 RX ORDER — REGADENOSON 0.08 MG/ML
0.4 INJECTION, SOLUTION INTRAVENOUS
Qty: 4 | Refills: 0 | Status: COMPLETED | OUTPATIENT
Start: 2020-07-24

## 2020-08-12 ENCOUNTER — APPOINTMENT (OUTPATIENT)
Dept: CARDIOLOGY | Facility: CLINIC | Age: 70
End: 2020-08-12

## 2020-08-13 ENCOUNTER — NON-APPOINTMENT (OUTPATIENT)
Age: 70
End: 2020-08-13

## 2020-08-13 ENCOUNTER — APPOINTMENT (OUTPATIENT)
Dept: CARDIOLOGY | Facility: CLINIC | Age: 70
End: 2020-08-13
Payer: MEDICARE

## 2020-08-13 VITALS
HEIGHT: 71 IN | BODY MASS INDEX: 32.2 KG/M2 | HEART RATE: 65 BPM | SYSTOLIC BLOOD PRESSURE: 140 MMHG | TEMPERATURE: 97.9 F | DIASTOLIC BLOOD PRESSURE: 84 MMHG | WEIGHT: 230 LBS | RESPIRATION RATE: 14 BRPM

## 2020-08-13 DIAGNOSIS — R06.02 SHORTNESS OF BREATH: ICD-10-CM

## 2020-08-13 PROCEDURE — 99215 OFFICE O/P EST HI 40 MIN: CPT

## 2020-08-13 PROCEDURE — 93000 ELECTROCARDIOGRAM COMPLETE: CPT

## 2020-08-13 NOTE — REVIEW OF SYSTEMS
[Chest  Pressure] : chest pressure [Shortness Of Breath] : shortness of breath [Negative] : Heme/Lymph

## 2020-08-13 NOTE — ASSESSMENT
[FreeTextEntry1] : EKG 8/13/2020:  The EKG illustrates sinus rhythm, rate of 65 bpm, left atrial enlargement pattern, slight left axis deviation.  \par \par

## 2020-08-13 NOTE — DISCUSSION/SUMMARY
[FreeTextEntry1] : 1).  Patient continues to c/o some atypical "chest heaviness" and shortness of breath at rest. His most recent ischemic workup with a Nuclear Stress test demonstrating some maria luz-infarct ischemia and hypokinesis not previously seen;  will complete a Left Heart Catheterization in the near future to assess coronary perfusion with grafts.\par \par 2).  He will complete the previously ordered echocardiogram and carotid doppler in September to reassess cardiac function / valvulopathy and carotid plaquing respectively.\par \par 3).  Patient is tolerating cardiac medications without negative side effects, continue with current cardiac medication regimen (refer above).\par \par 4).  Immediately go to the emergency department and/or report any untoward symptoms to our office including worsening chest heaviness, shortness of breath, excessive sweating, dizziness.\par \par 5).  Follow up with PCP (Dr. Pederson) regarding routine checkups and blood work including fasting lipid panel, have copy faxed to our office. \par \par 6).  Follow up with Dr. Garzon on October 12th or PRN.

## 2020-08-13 NOTE — HISTORY OF PRESENT ILLNESS
[FreeTextEntry1] : He continues to report being able to mow the lawn at his house and do his daughters yard and generally feel well;\par \par Patient is tolerating cardiac medications without negative side effects including Eliquis 2.5 mg BID, Metoprolol Succinate 50 mg QD, Crestor 10 mg QHS.\par \par Most recent Pharmacologic Nuclear Stress test (7/24/2020):  Patient developed NO dysrhythmias and EKG was negative for ischemia.  The myocardial perfusion images were ABNORMAL with a small basal inferior wall infarct with mild maria luz-infarct ischemia.  There was moderate hypokinesis of the baasal inferior walls with preserved LV function;  LVEF of 66%.

## 2020-08-13 NOTE — REASON FOR VISIT
Chief Complaint   Patient presents with   • Annual Wellness Visit         HPI:  Pam is a 76 y.o. here for Medicare Annual Wellness Visit        Patient Active Problem List    Diagnosis Date Noted   • Coronary artery disease involving native coronary artery of native heart without angina pectoris 09/14/2017   • Postablative hypothyroidism 09/14/2017   • Hypercholesterolemia 09/14/2017   • Tobacco use disorder, mild, abuse 09/14/2017   • Prediabetes 09/14/2017   • Essential hypertension 09/14/2017       Current Outpatient Prescriptions   Medication Sig Dispense Refill   • atorvastatin (LIPITOR) 80 MG tablet TAKE 1 TABLET DAILY 90 Tab 3   • hydroCHLOROthiazide (HYDRODIURIL) 25 MG Tab TAKE 1 TABLET DAILY 90 Tab 3   • lisinopril (PRINIVIL) 10 MG Tab TAKE 1 TABLET DAILY 90 Tab 3   • metformin (GLUCOPHAGE) 1000 MG tablet TAKE 1 TABLET TWICE A DAY WITH MEALS 180 Tab 3   • levothyroxine (SYNTHROID) 100 MCG Tab Take 1 Tab by mouth Every morning on an empty stomach. 90 Tab 3   • aspirin (ASA) 81 MG Chew Tab chewable tablet Take 81 mg by mouth every day.       No current facility-administered medications for this visit.         Patient is taking medications as noted in medication list.  Current supplements as per medication list.     Allergies: Patient has no known allergies.    Current social contact/activities: patient reports shopping with friends,     Is patient current with immunizations? No, due for TDAP and SHINGRIX (Shingles). Patient is interested in receiving NONE today.    She  reports that she has been smoking.  She has been smoking about 0.25 packs per day. She has never used smokeless tobacco. She reports that she does not drink alcohol or use drugs.  Ready to quit: Not Answered  Counseling given: Not Answered        DPA/Advanced directive: Patient has Advanced Directive, but it is not on file. Instructed to bring in a copy to scan into their chart.    ROS:    Gait: Uses assistive device, cane   Ostomy: No    Other tubes: No   Amputations: No   Chronic oxygen use No   Last eye exam patient reports every 32 years   Wears hearing aids: No   : Denies any urinary leakage during the last 6 months      Depression Screening    Little interest or pleasure in doing things?  0 - not at all  Feeling down, depressed, or hopeless? 0 - not at all  Patient Health Questionnaire Score: 0    If depressive symptoms identified deferred to follow up visit unless specifically addressed in assessment and plan.    Interpretation of PHQ-9 Total Score   Score Severity   1-4 No Depression   5-9 Mild Depression   10-14 Moderate Depression   15-19 Moderately Severe Depression   20-27 Severe Depression    Screening for Cognitive Impairment    Three Minute Recall (village, kitchen, baby)  2/3    Anuj clock face with all 12 numbers and set the hands to show 10 past 10.  Yes 5/5  If cognitive concerns identified, deferred for follow up unless specifically addressed in assessment and plan.    Fall Risk Assessment    Has the patient had two or more falls in the last year or any fall with injury in the last year?  No  If fall risk identified, deferred for follow up unless specifically addressed in assessment and plan.    Safety Assessment    Throw rugs on floor.  Yes  Handrails on all stairs.  No  Good lighting in all hallways.  Yes  Difficulty hearing.  No  Patient counseled about all safety risks that were identified.    Functional Assessment ADLs    Are there any barriers preventing you from cooking for yourself or meeting nutritional needs?  No.    Are there any barriers preventing you from driving safely or obtaining transportation?  No.    Are there any barriers preventing you from using a telephone or calling for help?  No.    Are there any barriers preventing you from shopping?  No.    Are there any barriers preventing you from taking care of your own finances?  No.    Are there any barriers preventing you from managing your medications?  No.     Are there any barriers preventing you from showering, bathing or dressing yourself?  No.    Are you currently engaging in any exercise or physical activity?  Yes.  Walks daily  What is your perception of your health?  Good.    Health Maintenance Summary                Annual Wellness Visit Overdue 1942     IMM DTaP/Tdap/Td Vaccine Overdue 12/4/1961     IMM ZOSTER VACCINES Overdue 12/4/1992     COLON CANCER SCREENING ANNUAL FIT Next Due 1/24/2020      Done 1/24/2019 OCCULT BLOOD FECES IMMUNOASSAY     Patient has more history with this topic...          Patient Care Team:  Ryanne Leos M.D. as PCP - General (Family Medicine)    Social History   Substance Use Topics   • Smoking status: Current Some Day Smoker     Packs/day: 0.25   • Smokeless tobacco: Never Used   • Alcohol use No     Family History   Problem Relation Age of Onset   • Cancer Mother         lung   • Cancer Father         colon   • Lung Cancer Father    • Heart Disease Sister    • Diabetes Sister      She  has a past medical history of Coronary artery disease involving native coronary artery of native heart without angina pectoris (9/14/2017); Essential hypertension (9/14/2017); Hypercholesterolemia (9/14/2017); Postablative hypothyroidism (9/14/2017); Prediabetes (9/14/2017); and Tobacco use disorder, mild, abuse (9/14/2017).   Past Surgical History:   Procedure Laterality Date   • CORONARY ARTERY BYPASS, 3  11/2000   • HYSTERECTOMY, VAGINAL     • TONSILLECTOMY AND ADENOIDECTOMY             Exam:     There were no vitals taken for this visit. There is no height or weight on file to calculate BMI.    Hearing excellent.    Dentition good  Alert, oriented in no acute distress.  Eye contact is good, speech goal directed, affect calm      Assessment and Plan. The following treatment and monitoring plan is recommended:    Coronary artery disease involving native coronary artery of native heart without angina pectoris  This is a chronic health  problem that is well controlled with current medications and lifestyle measures.  Continues to follow with cardiology    Essential hypertension  This is a chronic health problem that is well controlled with current medications and lifestyle measures.  Her BP is perfect at 124/76. The patient denies chest pain, shortness of breath or dyspnea on exertion.      Hypercholesterolemia  This is a chronic health problem that is well controlled with current medications and lifestyle measures.      Postablative hypothyroidism  This is a chronic health problem that is well controlled with current medications and lifestyle measures.  Patient continues on her thyroid replacement with levothyroxine 100 mcg daily.    Prediabetes  This is a chronic health problem that is well controlled with current medications and lifestyle measures.  Will continue to follow with labs in 3 months.    Tobacco use disorder, mild, abuse  This is a chronic health problem that is well controlled with current medications and lifestyle measures.  She limits her smoking only to when she is at the casinos because she does not want to smoke around her .        Services suggested: No services needed at this time  Health Care Screening recommendations as per orders if indicated.  Referrals offered: PT/OT/Nutrition counseling/Behavioral Health/Smoking cessation as per orders if indicated.    Discussion today about general wellness and lifestyle habits:    · Prevent falls and reduce trip hazards; Cautioned about securing or removing rugs.  · Have a working fire alarm and carbon monoxide detector;   · Engage in regular physical activity and social activities       Follow-up: follow up in 3 months with labs    Addendum: This office visit and chart was completed by Dr.Marie SANDY Leos as part of her usual day.   [FreeTextEntry1] : The patient is a 69-year-old white male with known history of ischemic heart disease and prior history for multivessel CABG dating back to 2016 is here today to review most recent Nuclear Stress test results;\par \par Mr. Hudson continues to report occasional feelings of shortness of breath somehow mostly at rest and not even particularly exertional related.  There are still associated feelings of "chest heaviness" and difficulty taking a deep breath.\par \par He denies diaphoresis, presyncope, syncope, PND, orthopnea, palpitations.

## 2020-08-13 NOTE — PHYSICAL EXAM
[General Appearance - In No Acute Distress] : no acute distress [Normal Appearance] : normal appearance [Normal Conjunctiva] : the conjunctiva exhibited no abnormalities [Normal Oral Mucosa] : normal oral mucosa [No Jugular Venous Guzman A Waves] : no jugular venous guzman A waves [Normal Jugular Venous A Waves Present] : normal jugular venous A waves present [Normal Jugular Venous V Waves Present] : normal jugular venous V waves present [] : no respiratory distress [Respiration, Rhythm And Depth] : normal respiratory rhythm and effort [Heart Rate And Rhythm] : heart rate and rhythm were normal [Auscultation Breath Sounds / Voice Sounds] : lungs were clear to auscultation bilaterally [Heart Sounds] : normal S1 and S2 [Edema] : no peripheral edema present [Abnormal Walk] : normal gait [Bowel Sounds] : normal bowel sounds [Skin Color & Pigmentation] : normal skin color and pigmentation [Cyanosis, Localized] : no localized cyanosis [Nail Clubbing] : no clubbing of the fingernails [Impaired Insight] : insight and judgment were intact [Skin Turgor] : normal skin turgor [Oriented To Time, Place, And Person] : oriented to person, place, and time [Affect] : the affect was normal [FreeTextEntry1] : Grade I/VI to II/VI systolic murmur, well-healed surgical scar in midline

## 2020-09-09 RX ORDER — KIT FOR THE PREPARATION OF TECHNETIUM TC99M SESTAMIBI 1 MG/5ML
INJECTION, POWDER, LYOPHILIZED, FOR SOLUTION PARENTERAL
Refills: 0 | Status: COMPLETED | OUTPATIENT
Start: 2020-09-09

## 2020-09-09 RX ADMIN — KIT FOR THE PREPARATION OF TECHNETIUM TC99M SESTAMIBI 0: 1 INJECTION, POWDER, LYOPHILIZED, FOR SOLUTION PARENTERAL at 00:00

## 2020-09-11 ENCOUNTER — APPOINTMENT (OUTPATIENT)
Dept: DISASTER EMERGENCY | Facility: CLINIC | Age: 70
End: 2020-09-11

## 2020-09-11 NOTE — H&P PST ADULT - ASSESSMENT
: This a 69 year old male PMH: DVT LLE post long car ride 1 1/2 year ago  on Eliquis)( Last dose on 9/11)   Valvular disease Ischemic heart disease s/p multivessel CABG 2016 with Dr Giron .He presented to his cardiologist with reports of shortness of breath and chest heaviness. A Nuclear stress test was done7/24/20   with abnormal myocardial perfusion images with a small basal inferior wall infarct with maria luz- infarct ischemia, with moderate hypokinesis of the basal inferior walls with a preserved EF 66 % . He reports dyspnea at rest with increasing frequency . Denies chest pain     `PRE-PROCEDURE ASSESSMENT  [facility] -Patient seen and examined  -Labs and EKG reviewed   -Pre-procedure teaching completed with patient and family  - Informed consent obtained   -Questions answered  - Pt received [id number]  prior to cardiac cath   Risks & benefits of procedure and sedation and risks and benefits for the alternative therapy have been explained to the patient in layman’s terms including but not limited to: allergic reaction, bleeding, infection, arrhythmia, respiratory compromise, renal and vascular compromise, limb damage, MI, CVA, emergent CABG/Vascular Surgery and death. Informed consent obtained and in chart.

## 2020-09-11 NOTE — H&P PST ADULT - HISTORY OF PRESENT ILLNESS
Narrative: This a 69 year old male PMH: Valvular disease Ischemic heart disease s/p multivessel CABG 2016 with Dr Giron . he presented to his cardiologist with reports of shortness of breath and chest heaviness. A Nuclear stress test was done20   with abnormal myocardial perfusion images with a small basal inferior wall infarct with maria luz- infarct ischemia, with moderate hypokinesis of the basal inferior walls with a preserved EF 66 %   He was referred for a cardiac cath with Dr Tyler on 20     Mallampati   ASA   BRA   GFR   Covid     Symptoms: chest heaviness        Angina (Class):        Ischemic Symptoms:     Heart Failure: none       Systolic/Diastolic/Combined:        NYHA Class (within 2 weeks):     Assessment of LVEF (Must be within 6 months):       EF:        Assessed by:        Date:     Prior Cardiac Interventions (LHC, stents, CABG):  Cardiac Cath Lab - Adult (16 @ 09:16) >  CORONARY VESSELS: The coronary circulation is right dominant.  Proximal left main: There was a 20 % stenosis.   Mid LAD: There was a tubular 95 % stenosis.  --  Distal LAD: There was a 70 % stenosis.  --  D1: There was a 70 % stenosis.  Ostial circumflex: There was a 20 % stenosis.   Proximal circumflex: There was a 40 % stenosis. FFR 1.0  Mid RCA: There was a 100 % stenosis.   RPLS: Fills via collateral from Circumflex.      Noninvasive Testin20        Protocol: Pharmacological        Duration of Exercise: n/a        Symptoms: none        EKG Changes: none        Myocardial Imaging: myocardial perfusion images with a small basal inferior wall infarct with maria luz- infarct ischemia, with moderate hypokinesis of the basal inferior walls with a preserved EF 66 %        Risk Assessment (Low, Medium, High):     Echo (Date, Findings):     Antianginal Therapies:  Beta Blockers:  Toprol 50 mg po daily          Associated Risk Factors:        Cerebrovascular Disease: N/A       Chronic Lung Disease: N/A       Peripheral Arterial Disease: N/A       Chronic Kidney Disease (if yes, what is GFR): N/A       Uncontrolled Diabetes (if yes, what is HgbA1C or FBS): N/A       Poorly Controlled Hypertension (if yes, what is SBP): N/A       Morbid Obesity (if yes, what is BMI): N/A       History of Recent Ventricular Arrhythmia: N/A       Inability to Ambulate Safely: N/A       Need for Therapeutic Anticoagulation: N/A       Antiplatelet or Contrast Allergy: N/A Narrative: This a 69 year old male PMH: DVT on Eliquis  Valvular disease Ischemic heart disease s/p multivessel CABG 2016 with Dr Giron .He presented to his cardiologist with reports of shortness of breath and chest heaviness. A Nuclear stress test was done20   with abnormal myocardial perfusion images with a small basal inferior wall infarct with maria luz- infarct ischemia, with moderate hypokinesis of the basal inferior walls with a preserved EF 66 %   He was referred for a cardiac cath with Dr Tyler on 20     Mallampati   ASA   BRA   GFR   Covid     Symptoms: chest heaviness        Angina (Class):        Ischemic Symptoms:     Heart Failure: none       Systolic/Diastolic/Combined:        NYHA Class (within 2 weeks):     Assessment of LVEF (Must be within 6 months):       EF:        Assessed by:        Date:     Prior Cardiac Interventions (LHC, stents, CABG):  Cardiac Cath Lab - Adult (16 @ 09:16) >  CORONARY VESSELS: The coronary circulation is right dominant.  Proximal left main: There was a 20 % stenosis.   Mid LAD: There was a tubular 95 % stenosis.  --  Distal LAD: There was a 70 % stenosis.  --  D1: There was a 70 % stenosis.  Ostial circumflex: There was a 20 % stenosis.   Proximal circumflex: There was a 40 % stenosis. FFR 1.0  Mid RCA: There was a 100 % stenosis.   RPLS: Fills via collateral from Circumflex.      Noninvasive Testin20        Protocol: Pharmacological        Duration of Exercise: n/a        Symptoms: none        EKG Changes: none        Myocardial Imaging: myocardial perfusion images with a small basal inferior wall infarct with maria luz- infarct ischemia, with moderate hypokinesis of the basal inferior walls with a preserved EF 66 %        Risk Assessment (Low, Medium, High):     Echo (Date, Findings):     Antianginal Therapies:  Beta Blockers:  Toprol 50 mg po daily          Associated Risk Factors:        Cerebrovascular Disease: N/A       Chronic Lung Disease: N/A       Peripheral Arterial Disease: N/A       Chronic Kidney Disease (if yes, what is GFR): N/A       Uncontrolled Diabetes (if yes, what is HgbA1C or FBS): N/A       Poorly Controlled Hypertension (if yes, what is SBP): N/A       Morbid Obesity (if yes, what is BMI): N/A       History of Recent Ventricular Arrhythmia: N/A       Inability to Ambulate Safely: N/A       Need for Therapeutic Anticoagulation: N/A       Antiplatelet or Contrast Allergy: N/A Narrative: This a 69 year old male PMH: DVT LLE post long car ride 1 1/2 year ago  on Eliquis)( Last dose on )   Valvular disease Ischemic heart disease s/p multivessel CABG 2016 with Dr Giron .He presented to his cardiologist with reports of shortness of breath and chest heaviness. A Nuclear stress test was done20   with abnormal myocardial perfusion images with a small basal inferior wall infarct with maria luz- infarct ischemia, with moderate hypokinesis of the basal inferior walls with a preserved EF 66 % . He reports dyspnea at rest with increasing frequency . Denies chest pain   He was referred for a cardiac cath with Dr Tyler on 20     Mallampati 2  ASA 2  BRA   GFR   Covid negative      Symptoms: chest heaviness        Angina (Class):        Ischemic Symptoms:     Heart Failure: none       Assessment of LVEF (Must be within 6 months):       EF: 66       Assessed by: NST        Date: 20     Prior Cardiac Interventions (LHC, stents, CABG):  Cardiac Cath Lab - Adult (16 @ 09:16) >  CORONARY VESSELS: The coronary circulation is right dominant.  Proximal left main: There was a 20 % stenosis.   Mid LAD: There was a tubular 95 % stenosis.  --  Distal LAD: There was a 70 % stenosis.  --  D1: There was a 70 % stenosis.  Ostial circumflex: There was a 20 % stenosis.   Proximal circumflex: There was a 40 % stenosis. FFR 1.0  Mid RCA: There was a 100 % stenosis.   RPLS: Fills via collateral from Circumflex.      Noninvasive Testin20        Protocol: Pharmacological        Duration of Exercise: n/a        Symptoms: none        EKG Changes: none        Myocardial Imaging: myocardial perfusion images with a small basal inferior wall infarct with maria luz- infarct ischemia, with moderate hypokinesis of the basal inferior walls with a preserved EF 66 %        Risk Assessment (Low, Medium, High):     Echo (Date, Findings):     Antianginal Therapies:  Beta Blockers:  Toprol 50 mg po daily          Associated Risk Factors:        Cerebrovascular Disease: N/A       Chronic Lung Disease: N/A       Peripheral Arterial Disease: N/A       Chronic Kidney Disease (if yes, what is GFR): N/A       Uncontrolled Diabetes (if yes, what is HgbA1C or FBS): N/A       Poorly Controlled Hypertension (if yes, what is SBP): N/A       Morbid Obesity (if yes, what is BMI): N/A       History of Recent Ventricular Arrhythmia: N/A       Inability to Ambulate Safely: N/A       Need for Therapeutic Anticoagulation: N/A       Antiplatelet or Contrast Allergy: N/A

## 2020-09-12 LAB — SARS-COV-2 N GENE NPH QL NAA+PROBE: NOT DETECTED

## 2020-09-14 ENCOUNTER — INPATIENT (INPATIENT)
Facility: HOSPITAL | Age: 70
LOS: 0 days | Discharge: HOME | DRG: 247 | End: 2020-09-15
Attending: INTERNAL MEDICINE | Admitting: INTERNAL MEDICINE
Payer: MEDICARE

## 2020-09-14 ENCOUNTER — TRANSCRIPTION ENCOUNTER (OUTPATIENT)
Age: 70
End: 2020-09-14

## 2020-09-14 ENCOUNTER — APPOINTMENT (OUTPATIENT)
Dept: CARDIOLOGY | Facility: CLINIC | Age: 70
End: 2020-09-14

## 2020-09-14 VITALS
DIASTOLIC BLOOD PRESSURE: 88 MMHG | OXYGEN SATURATION: 98 % | HEIGHT: 71 IN | HEART RATE: 66 BPM | TEMPERATURE: 98 F | SYSTOLIC BLOOD PRESSURE: 141 MMHG | WEIGHT: 229.94 LBS | RESPIRATION RATE: 16 BRPM

## 2020-09-14 DIAGNOSIS — Z98.89 OTHER SPECIFIED POSTPROCEDURAL STATES: Chronic | ICD-10-CM

## 2020-09-14 DIAGNOSIS — I25.10 ATHEROSCLEROTIC HEART DISEASE OF NATIVE CORONARY ARTERY WITHOUT ANGINA PECTORIS: ICD-10-CM

## 2020-09-14 LAB
ANION GAP SERPL CALC-SCNC: 11 MMOL/L — SIGNIFICANT CHANGE UP (ref 5–17)
APTT BLD: 35.5 SEC — SIGNIFICANT CHANGE UP (ref 27.5–35.5)
BASOPHILS # BLD AUTO: 0.06 K/UL — SIGNIFICANT CHANGE UP (ref 0–0.2)
BASOPHILS NFR BLD AUTO: 0.9 % — SIGNIFICANT CHANGE UP (ref 0–2)
BLD GP AB SCN SERPL QL: SIGNIFICANT CHANGE UP
BUN SERPL-MCNC: 16 MG/DL — SIGNIFICANT CHANGE UP (ref 8–20)
CALCIUM SERPL-MCNC: 8.9 MG/DL — SIGNIFICANT CHANGE UP (ref 8.6–10.2)
CHLORIDE SERPL-SCNC: 105 MMOL/L — SIGNIFICANT CHANGE UP (ref 98–107)
CO2 SERPL-SCNC: 23 MMOL/L — SIGNIFICANT CHANGE UP (ref 22–29)
CREAT SERPL-MCNC: 0.76 MG/DL — SIGNIFICANT CHANGE UP (ref 0.5–1.3)
EOSINOPHIL # BLD AUTO: 0.15 K/UL — SIGNIFICANT CHANGE UP (ref 0–0.5)
EOSINOPHIL NFR BLD AUTO: 2.2 % — SIGNIFICANT CHANGE UP (ref 0–6)
GLUCOSE SERPL-MCNC: 102 MG/DL — HIGH (ref 70–99)
HCT VFR BLD CALC: 47.3 % — SIGNIFICANT CHANGE UP (ref 39–50)
HGB BLD-MCNC: 16.3 G/DL — SIGNIFICANT CHANGE UP (ref 13–17)
IMM GRANULOCYTES NFR BLD AUTO: 0.3 % — SIGNIFICANT CHANGE UP (ref 0–1.5)
INR BLD: 1.02 RATIO — SIGNIFICANT CHANGE UP (ref 0.88–1.16)
LYMPHOCYTES # BLD AUTO: 1.63 K/UL — SIGNIFICANT CHANGE UP (ref 1–3.3)
LYMPHOCYTES # BLD AUTO: 24.1 % — SIGNIFICANT CHANGE UP (ref 13–44)
MAGNESIUM SERPL-MCNC: 2.1 MG/DL — SIGNIFICANT CHANGE UP (ref 1.6–2.6)
MCHC RBC-ENTMCNC: 30.4 PG — SIGNIFICANT CHANGE UP (ref 27–34)
MCHC RBC-ENTMCNC: 34.5 GM/DL — SIGNIFICANT CHANGE UP (ref 32–36)
MCV RBC AUTO: 88.1 FL — SIGNIFICANT CHANGE UP (ref 80–100)
MONOCYTES # BLD AUTO: 0.63 K/UL — SIGNIFICANT CHANGE UP (ref 0–0.9)
MONOCYTES NFR BLD AUTO: 9.3 % — SIGNIFICANT CHANGE UP (ref 2–14)
NEUTROPHILS # BLD AUTO: 4.26 K/UL — SIGNIFICANT CHANGE UP (ref 1.8–7.4)
NEUTROPHILS NFR BLD AUTO: 63.2 % — SIGNIFICANT CHANGE UP (ref 43–77)
PLATELET # BLD AUTO: 219 K/UL — SIGNIFICANT CHANGE UP (ref 150–400)
POTASSIUM SERPL-MCNC: 3.9 MMOL/L — SIGNIFICANT CHANGE UP (ref 3.5–5.3)
POTASSIUM SERPL-SCNC: 3.9 MMOL/L — SIGNIFICANT CHANGE UP (ref 3.5–5.3)
PROTHROM AB SERPL-ACNC: 11.8 SEC — SIGNIFICANT CHANGE UP (ref 10.6–13.6)
RBC # BLD: 5.37 M/UL — SIGNIFICANT CHANGE UP (ref 4.2–5.8)
RBC # FLD: 13.6 % — SIGNIFICANT CHANGE UP (ref 10.3–14.5)
SODIUM SERPL-SCNC: 139 MMOL/L — SIGNIFICANT CHANGE UP (ref 135–145)
WBC # BLD: 6.75 K/UL — SIGNIFICANT CHANGE UP (ref 3.8–10.5)
WBC # FLD AUTO: 6.75 K/UL — SIGNIFICANT CHANGE UP (ref 3.8–10.5)

## 2020-09-14 PROCEDURE — 93459 L HRT ART/GRFT ANGIO: CPT | Mod: 26,XU

## 2020-09-14 PROCEDURE — 99152 MOD SED SAME PHYS/QHP 5/>YRS: CPT

## 2020-09-14 PROCEDURE — 92978 ENDOLUMINL IVUS OCT C 1ST: CPT | Mod: 26,LC

## 2020-09-14 PROCEDURE — 92928 PRQ TCAT PLMT NTRAC ST 1 LES: CPT | Mod: LC

## 2020-09-14 PROCEDURE — 99223 1ST HOSP IP/OBS HIGH 75: CPT | Mod: 25

## 2020-09-14 PROCEDURE — 93567 NJX CAR CTH SPRVLV AORTGRPHY: CPT

## 2020-09-14 RX ORDER — CHLORHEXIDINE GLUCONATE 213 G/1000ML
1 SOLUTION TOPICAL ONCE
Refills: 0 | Status: DISCONTINUED | OUTPATIENT
Start: 2020-09-14 | End: 2020-09-15

## 2020-09-14 RX ORDER — APIXABAN 2.5 MG/1
2.5 TABLET, FILM COATED ORAL
Refills: 0 | Status: DISCONTINUED | OUTPATIENT
Start: 2020-09-15 | End: 2020-09-15

## 2020-09-14 RX ORDER — CLOPIDOGREL BISULFATE 75 MG/1
1 TABLET, FILM COATED ORAL
Qty: 90 | Refills: 3
Start: 2020-09-14 | End: 2021-09-08

## 2020-09-14 RX ORDER — METOPROLOL TARTRATE 50 MG
50 TABLET ORAL DAILY
Refills: 0 | Status: DISCONTINUED | OUTPATIENT
Start: 2020-09-14 | End: 2020-09-15

## 2020-09-14 RX ORDER — CLOPIDOGREL BISULFATE 75 MG/1
75 TABLET, FILM COATED ORAL DAILY
Refills: 0 | Status: DISCONTINUED | OUTPATIENT
Start: 2020-09-15 | End: 2020-09-15

## 2020-09-14 RX ORDER — LEVOTHYROXINE SODIUM 125 MCG
125 TABLET ORAL DAILY
Refills: 0 | Status: DISCONTINUED | OUTPATIENT
Start: 2020-09-14 | End: 2020-09-15

## 2020-09-14 RX ORDER — ATORVASTATIN CALCIUM 80 MG/1
80 TABLET, FILM COATED ORAL AT BEDTIME
Refills: 0 | Status: DISCONTINUED | OUTPATIENT
Start: 2020-09-14 | End: 2020-09-15

## 2020-09-14 RX ORDER — ASPIRIN/CALCIUM CARB/MAGNESIUM 324 MG
81 TABLET ORAL ONCE
Refills: 0 | Status: COMPLETED | OUTPATIENT
Start: 2020-09-14 | End: 2020-09-14

## 2020-09-14 RX ADMIN — ATORVASTATIN CALCIUM 80 MILLIGRAM(S): 80 TABLET, FILM COATED ORAL at 22:03

## 2020-09-14 RX ADMIN — Medication 81 MILLIGRAM(S): at 08:34

## 2020-09-14 NOTE — DISCHARGE NOTE PROVIDER - HOSPITAL COURSE
Admit to Hospital due to  hypertension during case and need for Anticoagulation therapy ( Eliquis )   Post procedure orders and observations    Plavix  75 mg po daily start 9/15  Pt to restart Eliquis 2.5 mg po BID   No triple therapy ( Reviewed with Dr Nayeli Singleton )   continue Statin Beta blocker therapy   Groin precautions maintained      Cardiac rehab information provided / referral and communication to cardiac rehab completed   Follow up with cardiologist

## 2020-09-14 NOTE — DISCHARGE NOTE PROVIDER - NSDCFUSCHEDAPPT_GEN_ALL_CORE_FT
RYAN TURCIOS ; 09/16/2020 ; [facility] [address] [facility] ; 09/16/2020 ; [facility] [address] [facility] ; 10/12/2020 ; [facility] [address] RYAN TURCIOS ; 09/16/2020 ; [facility] [address] [facility] ; 09/16/2020 ; [facility] [address] [facility] ; 10/12/2020 ; [facility] [address]

## 2020-09-14 NOTE — ASU PATIENT PROFILE, ADULT - NS PRO ABUSE SCREEN SUSPICION NEGLECT YN
Hans Woodward is a 79 year old male here for  Chief Complaint   Patient presents with   • Cancer     Lymphoma Unspecified Site     Denies latex allergy or sensitivity.    Medication verified, no changes.  PCP and Pharmacy verified.    Social History     Tobacco Use   Smoking Status Former Smoker   • Packs/day: 1.00   • Years: 7.00   • Pack years: 7.00   • Last attempt to quit: 1963   • Years since quittin.3   Smokeless Tobacco Never Used     Advance Directives Filed: Yes    ECOG:      Height: No.  Ht Readings from Last 1 Encounters:   19 5' 6.34\" (1.685 m)     Weight:Yes, shoes off.  Wt Readings from Last 3 Encounters:   20 83.4 kg   20 82 kg   01/15/20 81.6 kg       BMI: There is no height or weight on file to calculate BMI.    REVIEW OF SYSTEMS  GENERAL:  Patient denies headache, fevers, chills, night sweats, excessive fatigue, change in appetite, weight loss, dizziness  ALLERGIC/IMMUNOLOGIC: Verified allergies: Yes  EYES:  Patient denies significant visual difficulties, double vision, blurred vision  ENT/MOUTH: Patient denies problems with hearing, sore throat, sinus drainage, mouth sores  ENDOCRINE:  Patient denies diabetes, thyroid disease, hormone replacement, hot flashes  HEMATOLOGIC/LYMPHATIC: Patient denies easy bruising, bleeding, tender lymph nodes, swollen lymph nodes  BREASTS: Patient denies abnormal masses of breast, nipple discharge, pain  RESPIRATORY:  Patient denies lung pain with breathing, cough, coughing up blood, shortness of breath  CARDIOVASCULAR:  Patient denies anginal chest pain, palpitations, shortness of breath when lying flat, peripheral edema  GASTROINTESTINAL: Patient denies abdominal pain , nausea, vomiting, diarrhea, GI bleeding, constipation, change in bowel habits, heartburn, sensation of feeling full, difficulty swallowing  : Patient denies blood in the urine, burning with urination, frequency, urgency, hesitancy, incontinence  MUSCULOSKELETAL:   Patient denies joint pain, bone pain, joint swelling, redness, decreased range of motion  SKIN:  Patient denies chronic rashes, inflammation, ulcerations, skin changes, itching  NEUROLOGIC:  Patient denies loss of balance, areas of focal weakness, abnormal gait, sensory problems, numbness, tingling  PSYCHIATRIC: Patient denies insomnia, depression, anxiety   no

## 2020-09-14 NOTE — DISCHARGE NOTE PROVIDER - NSDCMRMEDTOKEN_GEN_ALL_CORE_FT
Align 4 mg oral capsule: 1 cap(s) orally once a day  clopidogrel 75 mg oral tablet: 1 tab(s) orally once a day  Eliquis 2.5 mg oral tablet: 1 tab(s) orally 2 times a day  Metoprolol Succinate ER 50 mg oral tablet, extended release: 1 tab(s) orally once a day  rosuvastatin 10 mg oral tablet: 1 tab(s) orally once a day  Synthroid 125 mcg (0.125 mg) oral tablet: 1 tab(s) orally once a day

## 2020-09-14 NOTE — PROGRESS NOTE ADULT - ASSESSMENT
: This a 69 year old male PMH: DVT LLE post long car ride 1 1/2 year ago  on Eliquis)( Last dose on 9/11)   Valvular disease Ischemic heart disease s/p multivessel CABG 2016 with Dr Giron .He presented to his cardiologist with reports of shortness of breath and chest heaviness. A Nuclear stress test was done7/24/20   with abnormal myocardial perfusion images with a small basal inferior wall infarct with maria luz- infarct ischemia, with moderate hypokinesis of the basal inferior walls with a preserved EF 66 % . He reports dyspnea at rest with increasing frequency . Denies chest pain   He was referred for a cardiac cath with Dr Tyler on 9/14/20   s/p Cardiac cath and intervention  with CHANDRA done via RFA tolerated well     a/p   Admit to Hospital due to  hypertension during case and need for Anticoagulation therapy ( Eliquis )   Post procedure orders and observations    Plavix  75 mg po daily start 9/15  Pt to restart Eliquis 2.5 mg po BID   No triple therapy ( Reviewed with Dr Nayeli Singleton )   continue Statin Beta blocker therapy   Groin precautions maintained   Bedrest for 3 hours if stable   Ambulate if stable   Cardiac rehab information provided / referral and communication to cardiac rehab completed   Follow up with cardiologist

## 2020-09-14 NOTE — PROGRESS NOTE ADULT - SUBJECTIVE AND OBJECTIVE BOX
s/p Cardiac cath and intervention of LCx with CHANDRA   post cath painful urinary retention straight cath 700cc clear yellow urine with relief       Medications during cardiac cath :   Versed 2 mg   Fentanyl 100mcg  Heparin 12,000units   Plavix 600mg   Omnipaque 174 cc     Vital Signs Last 24 Hrs  T(C): 36.8 (14 Sep 2020 07:59), Max: 36.8 (14 Sep 2020 07:59)  T(F): 98.2 (14 Sep 2020 07:59), Max: 98.2 (14 Sep 2020 07:59)  HR: 78 (14 Sep 2020 11:38) (66 - 78)  BP: 166/71 (14 Sep 2020 11:38) (141/88 - 169/93)  RR: 16 (14 Sep 2020 11:38) (16 - 16)  SpO2: 98% (14 Sep 2020 11:38) (96% - 98%)  Daily Height in cm: 180.34 (14 Sep 2020 07:59)    Post EKG: NSR rate 64 no acute ST or T wave changes   Medications:  atorvastatin 80 milliGRAM(s) Oral at bedtime  chlorhexidine 4% Liquid 1 Application(s) Topical Once  levothyroxine 125 MICROGram(s) Oral daily  metoprolol succinate ER 50 milliGRAM(s) Oral daily      General: A/ox 3, No acute Distress  Neck: Supple, NO JVD  Cardiac: S1 S2, No M/R/G  Pulmonary: CTAB, Breathing unlabored, No Rhonchi/Rales/Wheezing  Abdomen: Soft, Non -tender, +BS   Extremities: No Rashes, No edema  Femoral cath site no evidence of bleeding + pp hemostasis maintained with Angioseal device   Neuro: A/o x 3, No focal deficits  Psch: normal mood , normal affect    LABS:                          16.3   6.75  )-----------( 219      ( 14 Sep 2020 07:55 )             47.3     09-14    139  |  105  |  16.0  ----------------------------<  102<H>  3.9   |  23.0  |  0.76    Ca    8.9      14 Sep 2020 07:55  Mg     2.1     09-14      PT/INR - ( 14 Sep 2020 07:55 )   PT: 11.8 sec;   INR: 1.02 ratio         PTT - ( 14 Sep 2020 07:55 )  PTT:35.5 sec

## 2020-09-14 NOTE — DISCHARGE NOTE PROVIDER - NSDCCPCAREPLAN_GEN_ALL_CORE_FT
PRINCIPAL DISCHARGE DIAGNOSIS  Diagnosis: CAD (coronary artery disease)  Assessment and Plan of Treatment: post stent to LCX   Continue plavix for one year   continue Eliquis   follow up  with cardiologist

## 2020-09-14 NOTE — DISCHARGE NOTE PROVIDER - CARE PROVIDERS DIRECT ADDRESSES
[email or web address]

## 2020-09-14 NOTE — DISCHARGE NOTE PROVIDER - CARE PROVIDER_API CALL
Merlin Tyler  CARDIOVASCULAR DISEASE  [address], [address]  [place] [address]  Phone: [phone]  Fax: [phone]  Follow Up Time:    Merlin Tyler  CARDIOVASCULAR DISEASE  [address], [address]  [place] [address]  Phone: [phone]  Fax: [phone]  Follow Up Time:     Robert Garzon  INTERNAL MEDICINE  [address] [place] [address]  Phone: [phone]  Fax: [phone]  Follow Up Time:

## 2020-09-14 NOTE — DISCHARGE NOTE PROVIDER - NSDCFUADDAPPT_GEN_ALL_CORE_FT
- Bruising at the groin, sometimes extending down the leg, and/or a small lump under the skin at the groin access site is normal and will resolve within 2 – 3 weeks.   - Occasional skipped beats or palpitations that last for a few beats are common and generally resolve within 1-2 months.   - You may walk and take stairs at a regular pace.   - Do not perform any exercise more strenuous than walking for 1 week.   - Do not strain or lift heavy objects for 1 week.  - You may shower the day after the procedure.  - Do not soak in water (such as tub baths, hot tubs, swimming, etc.) for 1 week.   - You may resume all other activities the day after the procedure.  Call your doctor if:   - you notice bleeding, redness, drainage, swelling, increased tenderness or a hot sensation around the catheter insertion site.   - your temperature is greater than 100 degrees F for more than 24 hours.  - your rapid heart rhythm returns.  - you have any questions or concerns regarding the procedure.  If significant bleeding and/or a large lump (the size of a golf ball or bigger) occurs:  - Lie flat and apply continuous direct pressure just above the puncture site for at least 10 minutes  - If the issue resolves, notify your physician immediately.    - If the bleeding cannot be controlled, please seek immediate medical attention.  If you experience increased difficulty breathing or chest pain, or if you faint or have dizzy spells, please seek immediate medical attention.

## 2020-09-14 NOTE — DISCHARGE NOTE PROVIDER - PROVIDER TOKENS
PROVIDER:[TOKEN:[[id number]]] PROVIDER:[TOKEN:[[id number]]],PROVIDER:[TOKEN:[[id number]]]

## 2020-09-15 VITALS
RESPIRATION RATE: 16 BRPM | OXYGEN SATURATION: 97 % | DIASTOLIC BLOOD PRESSURE: 75 MMHG | SYSTOLIC BLOOD PRESSURE: 129 MMHG | HEART RATE: 64 BPM

## 2020-09-15 LAB
ALBUMIN SERPL ELPH-MCNC: 3.7 G/DL — SIGNIFICANT CHANGE UP (ref 3.3–5.2)
ALP SERPL-CCNC: 50 U/L — SIGNIFICANT CHANGE UP (ref 40–120)
ALT FLD-CCNC: 12 U/L — SIGNIFICANT CHANGE UP
ANION GAP SERPL CALC-SCNC: 10 MMOL/L — SIGNIFICANT CHANGE UP (ref 5–17)
AST SERPL-CCNC: 19 U/L — SIGNIFICANT CHANGE UP
BASOPHILS # BLD AUTO: 0.03 K/UL — SIGNIFICANT CHANGE UP (ref 0–0.2)
BASOPHILS NFR BLD AUTO: 0.4 % — SIGNIFICANT CHANGE UP (ref 0–2)
BILIRUB SERPL-MCNC: 0.7 MG/DL — SIGNIFICANT CHANGE UP (ref 0.4–2)
BUN SERPL-MCNC: 14 MG/DL — SIGNIFICANT CHANGE UP (ref 8–20)
CALCIUM SERPL-MCNC: 8.5 MG/DL — LOW (ref 8.6–10.2)
CHLORIDE SERPL-SCNC: 106 MMOL/L — SIGNIFICANT CHANGE UP (ref 98–107)
CO2 SERPL-SCNC: 23 MMOL/L — SIGNIFICANT CHANGE UP (ref 22–29)
CREAT SERPL-MCNC: 0.65 MG/DL — SIGNIFICANT CHANGE UP (ref 0.5–1.3)
EOSINOPHIL # BLD AUTO: 0.19 K/UL — SIGNIFICANT CHANGE UP (ref 0–0.5)
EOSINOPHIL NFR BLD AUTO: 2.8 % — SIGNIFICANT CHANGE UP (ref 0–6)
GLUCOSE SERPL-MCNC: 95 MG/DL — SIGNIFICANT CHANGE UP (ref 70–99)
HCT VFR BLD CALC: 42.8 % — SIGNIFICANT CHANGE UP (ref 39–50)
HGB BLD-MCNC: 15 G/DL — SIGNIFICANT CHANGE UP (ref 13–17)
IMM GRANULOCYTES NFR BLD AUTO: 0.1 % — SIGNIFICANT CHANGE UP (ref 0–1.5)
LYMPHOCYTES # BLD AUTO: 0.91 K/UL — LOW (ref 1–3.3)
LYMPHOCYTES # BLD AUTO: 13.3 % — SIGNIFICANT CHANGE UP (ref 13–44)
MCHC RBC-ENTMCNC: 30.7 PG — SIGNIFICANT CHANGE UP (ref 27–34)
MCHC RBC-ENTMCNC: 35 GM/DL — SIGNIFICANT CHANGE UP (ref 32–36)
MCV RBC AUTO: 87.7 FL — SIGNIFICANT CHANGE UP (ref 80–100)
MONOCYTES # BLD AUTO: 0.68 K/UL — SIGNIFICANT CHANGE UP (ref 0–0.9)
MONOCYTES NFR BLD AUTO: 10 % — SIGNIFICANT CHANGE UP (ref 2–14)
NEUTROPHILS # BLD AUTO: 5 K/UL — SIGNIFICANT CHANGE UP (ref 1.8–7.4)
NEUTROPHILS NFR BLD AUTO: 73.4 % — SIGNIFICANT CHANGE UP (ref 43–77)
PLATELET # BLD AUTO: 184 K/UL — SIGNIFICANT CHANGE UP (ref 150–400)
POTASSIUM SERPL-MCNC: 3.8 MMOL/L — SIGNIFICANT CHANGE UP (ref 3.5–5.3)
POTASSIUM SERPL-SCNC: 3.8 MMOL/L — SIGNIFICANT CHANGE UP (ref 3.5–5.3)
PROT SERPL-MCNC: 5.9 G/DL — LOW (ref 6.6–8.7)
RBC # BLD: 4.88 M/UL — SIGNIFICANT CHANGE UP (ref 4.2–5.8)
RBC # FLD: 13.5 % — SIGNIFICANT CHANGE UP (ref 10.3–14.5)
SODIUM SERPL-SCNC: 139 MMOL/L — SIGNIFICANT CHANGE UP (ref 135–145)
WBC # BLD: 6.82 K/UL — SIGNIFICANT CHANGE UP (ref 3.8–10.5)
WBC # FLD AUTO: 6.82 K/UL — SIGNIFICANT CHANGE UP (ref 3.8–10.5)

## 2020-09-15 PROCEDURE — C1769: CPT

## 2020-09-15 PROCEDURE — C1887: CPT

## 2020-09-15 PROCEDURE — 86901 BLOOD TYPING SEROLOGIC RH(D): CPT

## 2020-09-15 PROCEDURE — 92978 ENDOLUMINL IVUS OCT C 1ST: CPT | Mod: LC

## 2020-09-15 PROCEDURE — 83735 ASSAY OF MAGNESIUM: CPT

## 2020-09-15 PROCEDURE — 86900 BLOOD TYPING SEROLOGIC ABO: CPT

## 2020-09-15 PROCEDURE — 80053 COMPREHEN METABOLIC PANEL: CPT

## 2020-09-15 PROCEDURE — 93459 L HRT ART/GRFT ANGIO: CPT | Mod: XU

## 2020-09-15 PROCEDURE — 99152 MOD SED SAME PHYS/QHP 5/>YRS: CPT

## 2020-09-15 PROCEDURE — C1760: CPT

## 2020-09-15 PROCEDURE — C1894: CPT

## 2020-09-15 PROCEDURE — C1753: CPT

## 2020-09-15 PROCEDURE — 93005 ELECTROCARDIOGRAM TRACING: CPT

## 2020-09-15 PROCEDURE — C1874: CPT

## 2020-09-15 PROCEDURE — 85730 THROMBOPLASTIN TIME PARTIAL: CPT

## 2020-09-15 PROCEDURE — 85025 COMPLETE CBC W/AUTO DIFF WBC: CPT

## 2020-09-15 PROCEDURE — 36415 COLL VENOUS BLD VENIPUNCTURE: CPT

## 2020-09-15 PROCEDURE — 85610 PROTHROMBIN TIME: CPT

## 2020-09-15 PROCEDURE — 99153 MOD SED SAME PHYS/QHP EA: CPT

## 2020-09-15 PROCEDURE — 93010 ELECTROCARDIOGRAM REPORT: CPT

## 2020-09-15 PROCEDURE — C9600: CPT | Mod: LC

## 2020-09-15 PROCEDURE — 93567 NJX CAR CTH SPRVLV AORTGRPHY: CPT

## 2020-09-15 PROCEDURE — C1725: CPT

## 2020-09-15 PROCEDURE — 80048 BASIC METABOLIC PNL TOTAL CA: CPT

## 2020-09-15 PROCEDURE — 86850 RBC ANTIBODY SCREEN: CPT

## 2020-09-15 PROCEDURE — 99232 SBSQ HOSP IP/OBS MODERATE 35: CPT

## 2020-09-15 RX ADMIN — Medication 125 MICROGRAM(S): at 05:49

## 2020-09-15 RX ADMIN — CLOPIDOGREL BISULFATE 75 MILLIGRAM(S): 75 TABLET, FILM COATED ORAL at 08:59

## 2020-09-15 RX ADMIN — APIXABAN 2.5 MILLIGRAM(S): 2.5 TABLET, FILM COATED ORAL at 05:49

## 2020-09-15 RX ADMIN — Medication 50 MILLIGRAM(S): at 05:49

## 2020-09-15 NOTE — PROGRESS NOTE ADULT - SUBJECTIVE AND OBJECTIVE BOX
Pt s/p Cardiac cath  and intervention done RFA tolerated well   No overnight events       HPI:  Narrative: This a 69 year old male PMH: DVT LLE post long car ride 1 1/2 year ago  on Eliquis)( Last dose on 9/11)   Valvular disease Ischemic heart disease s/p multivessel CABG 2016 with Dr Giron .He presented to his cardiologist with reports of shortness of breath and chest heaviness. A Nuclear stress test was done7/24/20   with abnormal myocardial perfusion images with a small basal inferior wall infarct with maria luz- infarct ischemia, with moderate hypokinesis of the basal inferior walls with a preserved EF 66 % . He reports dyspnea at rest with increasing frequency . Denies chest pain   He was referred for a cardiac cath with Dr Tyler on 9/14/20                Vital Signs Last 24 Hrs  T(C): 36.8 (14 Sep 2020 07:59), Max: 36.8 (14 Sep 2020 07:59)  T(F): 98.2 (14 Sep 2020 07:59), Max: 98.2 (14 Sep 2020 07:59)  HR: 64 (15 Sep 2020 05:30) (58 - 78)  BP: 129/75 (15 Sep 2020 05:30) (124/76 - 169/93)  RR: 16 (15 Sep 2020 05:30) (16 - 16)  SpO2: 97% (15 Sep 2020 05:30) (95% - 98%)  Daily Height in cm: 180.34 (14 Sep 2020 07:59)    EKG : 9/15 7:51 NSR rate 65 T wave flat in AVF and down in III no acute changes   telemetry SB - SR no ectopy overnight       Medications:  apixaban 2.5 milliGRAM(s) Oral two times a day  atorvastatin 80 milliGRAM(s) Oral at bedtime  chlorhexidine 4% Liquid 1 Application(s) Topical Once  clopidogrel Tablet 75 milliGRAM(s) Oral daily  levothyroxine 125 MICROGram(s) Oral daily  metoprolol succinate ER 50 milliGRAM(s) Oral daily      General: A/ox 3, No acute Distress  Neck: Supple, NO JVD  Cardiac: S1 S2, No M/R/G  Pulmonary: CTAB, Breathing unlabored, No Rhonchi/Rales/Wheezing  Abdomen: Soft, Non -tender, +BS   Extremities: No Rashes, No edema  Femoral cath site no evidence of bleeding + pp hemostasis maintained   Neuro: A/o x 3, No focal deficits  Psch: normal mood , normal affect    LABS:                          15.0   6.82  )-----------( 184      ( 15 Sep 2020 05:44 )             42.8     09-15    139  |  106  |  14.0  ----------------------------<  95  3.8   |  23.0  |  0.65    Ca    8.5<L>      15 Sep 2020 05:44  Mg     2.1     09-14    TPro  5.9<L>  /  Alb  3.7  /  TBili  0.7  /  DBili  x   /  AST  19  /  ALT  12  /  AlkPhos  50  09-15    PT/INR - ( 14 Sep 2020 07:55 )   PT: 11.8 sec;   INR: 1.02 ratio         PTT - ( 14 Sep 2020 07:55 )  PTT:35.5 sec

## 2020-09-15 NOTE — PROGRESS NOTE ADULT - ASSESSMENT
: This a 69 year old male PMH: DVT LLE post long car ride 1 1/2 year ago  on Eliquis)( Last dose on 9/11)   Valvular disease Ischemic heart disease s/p multivessel CABG 2016 with Dr Giron .He presented to his cardiologist with reports of shortness of breath and chest heaviness. A Nuclear stress test was done7/24/20   with abnormal myocardial perfusion images with a small basal inferior wall infarct with maria luz- infarct ischemia, with moderate hypokinesis of the basal inferior walls with a preserved EF 66 % . He reports dyspnea at rest with increasing frequency . Denies chest pain   He was referred for a cardiac cath with Dr Tyler on 9/14/20   s/p Cardiac cath and intervention  with CHANDRA done via RFA tolerated well   s/p CHANDRA LCX x one LIMA - LAD patent other grafts occluded . Post procedure  unable to void due to retention  with straight cath . No further overnight issues today   ambulating pain free     a/p   Admit to Hospital due to  hypertension during case and need for Anticoagulation therapy ( Eliquis )   Post procedure orders and observations    Plavix  75 mg po daily start 9/15  Pt to restart Eliquis 2.5 mg po BID   No triple therapy ( Reviewed with Dr Nayeli Singleton )   continue Statin Beta blocker therapy   Groin precautions maintained     Cardiac rehab information provided / referral and communication to cardiac rehab completed   Follow up with cardiologist 1 week post discharge

## 2020-09-16 ENCOUNTER — APPOINTMENT (OUTPATIENT)
Dept: CARDIOLOGY | Facility: CLINIC | Age: 70
End: 2020-09-16
Payer: MEDICARE

## 2020-09-16 PROCEDURE — 93306 TTE W/DOPPLER COMPLETE: CPT

## 2020-09-16 PROCEDURE — 93880 EXTRACRANIAL BILAT STUDY: CPT

## 2020-10-12 ENCOUNTER — NON-APPOINTMENT (OUTPATIENT)
Age: 70
End: 2020-10-12

## 2020-10-12 ENCOUNTER — APPOINTMENT (OUTPATIENT)
Dept: CARDIOLOGY | Facility: CLINIC | Age: 70
End: 2020-10-12
Payer: MEDICARE

## 2020-10-12 VITALS
SYSTOLIC BLOOD PRESSURE: 131 MMHG | DIASTOLIC BLOOD PRESSURE: 82 MMHG | HEIGHT: 71 IN | TEMPERATURE: 97.8 F | BODY MASS INDEX: 31.92 KG/M2 | HEART RATE: 61 BPM | RESPIRATION RATE: 14 BRPM | WEIGHT: 228 LBS

## 2020-10-12 PROCEDURE — 99215 OFFICE O/P EST HI 40 MIN: CPT

## 2020-10-12 PROCEDURE — 93000 ELECTROCARDIOGRAM COMPLETE: CPT

## 2020-10-12 NOTE — HISTORY OF PRESENT ILLNESS
[FreeTextEntry1] : Protonix out patient reports he has significant improvement in symptoms would no longer experiencing chest pain or dyspnea and slowly resuming some of his activities\par \par He was placed on Plavix 75 mg daily and in addition takes low dose Eliquis 2.5 mg b.i.d. for history of DVT;\par \par

## 2020-10-12 NOTE — REASON FOR VISIT
[Follow-Up - Clinic] : a clinic follow-up of [FreeTextEntry1] : The patient is a 69-year-old white male with remote history for multivessel CABG in 2016;\par \par He presented over the summer with new symptoms of chest discomfort/pain and some dyspnea which led to nuclear stress test that demonstrated new ischemia in the lateral wall suggesting infarcts last ischemic pattern;\par \par Subsequent cardiac catheter demonstrated ;  Patent LIMA to LAD, severe native vessel disease, occluded SVG to circumflex with jump graft to LPDA- residual 80% lesion noted in the circumflex vessel which was able to be successfully stented with one CHANDRA;

## 2020-10-12 NOTE — PHYSICAL EXAM
[General Appearance - Well Developed] : well developed [Normal Appearance] : normal appearance [Well Groomed] : well groomed [General Appearance - Well Nourished] : well nourished [No Deformities] : no deformities [General Appearance - In No Acute Distress] : no acute distress [Normal Conjunctiva] : the conjunctiva exhibited no abnormalities [Eyelids - No Xanthelasma] : the eyelids demonstrated no xanthelasmas [Normal Oral Mucosa] : normal oral mucosa [No Oral Pallor] : no oral pallor [No Oral Cyanosis] : no oral cyanosis [Normal Jugular Venous A Waves Present] : normal jugular venous A waves present [Normal Jugular Venous V Waves Present] : normal jugular venous V waves present [No Jugular Venous Guzman A Waves] : no jugular venous guzman A waves [Respiration, Rhythm And Depth] : normal respiratory rhythm and effort [Exaggerated Use Of Accessory Muscles For Inspiration] : no accessory muscle use [Auscultation Breath Sounds / Voice Sounds] : lungs were clear to auscultation bilaterally [Edema] : no peripheral edema present [Abdomen Soft] : soft [Abdomen Tenderness] : non-tender [Abdomen Mass (___ Cm)] : no abdominal mass palpated [Abnormal Walk] : normal gait [Gait - Sufficient For Exercise Testing] : the gait was sufficient for exercise testing [Nail Clubbing] : no clubbing of the fingernails [Cyanosis, Localized] : no localized cyanosis [FreeTextEntry1] : Left lower extremity swelling noted [Skin Color & Pigmentation] : normal skin color and pigmentation [] : no rash [No Venous Stasis] : no venous stasis [Skin Lesions] : no skin lesions [No Skin Ulcers] : no skin ulcer [No Xanthoma] : no  xanthoma was observed [Oriented To Time, Place, And Person] : oriented to person, place, and time [Affect] : the affect was normal [Mood] : the mood was normal [No Anxiety] : not feeling anxious

## 2020-10-12 NOTE — ASSESSMENT
[FreeTextEntry1] : EKG demonstrating normal sinus rhythm at a rate of 61-essentially within normal limits\par \par Recent carotid duplex study demonstrated on 9/16/20, bilateral mild calcified and heterogeneous plaque with normal flow bilaterally;\par \par Recent transthoracic echo from 9/16/20 demonstrated mild LVH with preserved LV systolic function and normal ejection fraction in range of 60%. Mild aortic stenosis/sclerosis mild dilatation of ascending aorta and aortic root. Moderate enlarged left ventricle. Mild MR, mild TR, mild to moderate PI;;\par \par \par In summary this 69-year-old gentleman with prior history of CABG and status post recent PCI/stent to circumflex vessel with improvement in symptoms, mild carotid plaquing stenosis, mild aortic stenosis has had a stable cardiac pattern of recent and current antiplatelet drug and anticoagulation;\par \par Plan:\par \par Patient counseled on a port in some gradually ramping up physical activity and exercise as discussed\par \par Continue current multiple medical regimen\par \par Discuss possibility of future nuclear stress test to reassess perfusion status post PCI/stent\par \par Return to this office within 3 months or p.r.n.;;;;

## 2021-01-12 ENCOUNTER — RX RENEWAL (OUTPATIENT)
Age: 71
End: 2021-01-12

## 2021-03-04 ENCOUNTER — NON-APPOINTMENT (OUTPATIENT)
Age: 71
End: 2021-03-04

## 2021-03-04 ENCOUNTER — APPOINTMENT (OUTPATIENT)
Dept: CARDIOLOGY | Facility: CLINIC | Age: 71
End: 2021-03-04
Payer: MEDICARE

## 2021-03-04 VITALS
BODY MASS INDEX: 30.8 KG/M2 | SYSTOLIC BLOOD PRESSURE: 122 MMHG | HEART RATE: 66 BPM | WEIGHT: 220 LBS | RESPIRATION RATE: 16 BRPM | HEIGHT: 71 IN | TEMPERATURE: 98.2 F | DIASTOLIC BLOOD PRESSURE: 80 MMHG

## 2021-03-04 PROCEDURE — 99214 OFFICE O/P EST MOD 30 MIN: CPT

## 2021-03-04 PROCEDURE — 93000 ELECTROCARDIOGRAM COMPLETE: CPT

## 2021-03-04 NOTE — REASON FOR VISIT
[Follow-Up - Clinic] : a clinic follow-up of [FreeTextEntry1] : The patient is a 70-year-old gentleman with known history for coronary disease and prior remote history for multivessel CABG 2 developed unstable angina this past September and required a PCI/CHANDRA to the circumflex vessel after there was an occluded graft to the circumflex vessel found;\par \par He returns to the office today for general cardiac checkup\par \par Patient reports that he has generally been feeling well and out doing some physical chores and exercises without difficulty\par \par He notes that he gets occasional twinges of chest-like discomfort when he lays on his left side at night;\par Otherwise, there has been no significant chest pain, shortness of breath, palpitations or dizziness;;;

## 2021-03-04 NOTE — PHYSICAL EXAM
[General Appearance - Well Developed] : well developed [Normal Appearance] : normal appearance [Well Groomed] : well groomed [General Appearance - Well Nourished] : well nourished [No Deformities] : no deformities [General Appearance - In No Acute Distress] : no acute distress [Normal Conjunctiva] : the conjunctiva exhibited no abnormalities [Eyelids - No Xanthelasma] : the eyelids demonstrated no xanthelasmas [Normal Oral Mucosa] : normal oral mucosa [No Oral Pallor] : no oral pallor [No Oral Cyanosis] : no oral cyanosis [Normal Jugular Venous A Waves Present] : normal jugular venous A waves present [Normal Jugular Venous V Waves Present] : normal jugular venous V waves present [No Jugular Venous Guzman A Waves] : no jugular venous guzman A waves [Respiration, Rhythm And Depth] : normal respiratory rhythm and effort [Exaggerated Use Of Accessory Muscles For Inspiration] : no accessory muscle use [Auscultation Breath Sounds / Voice Sounds] : lungs were clear to auscultation bilaterally [Edema] : no peripheral edema present [Bowel Sounds] : normal bowel sounds [Abdomen Soft] : soft [Abdomen Tenderness] : non-tender [Abdomen Mass (___ Cm)] : no abdominal mass palpated [Abnormal Walk] : normal gait [Gait - Sufficient For Exercise Testing] : the gait was sufficient for exercise testing [Nail Clubbing] : no clubbing of the fingernails [Cyanosis, Localized] : no localized cyanosis [FreeTextEntry1] : Left lower extremity swelling noted [Skin Color & Pigmentation] : normal skin color and pigmentation [] : no rash [No Venous Stasis] : no venous stasis [Skin Lesions] : no skin lesions [No Skin Ulcers] : no skin ulcer [No Xanthoma] : no  xanthoma was observed [Oriented To Time, Place, And Person] : oriented to person, place, and time [Affect] : the affect was normal [Mood] : the mood was normal [No Anxiety] : not feeling anxious

## 2021-03-04 NOTE — HISTORY OF PRESENT ILLNESS
[FreeTextEntry1] : Patient has been followed for abdominal rectus  hernia which was advised to be just followed medically by his general surgeon;\par \par He is tolerating his current medical regimen without difficulty;\par \par Patient has been on anticoagulation therapy with Eliquis for coagulopathy;

## 2021-03-04 NOTE — ASSESSMENT
[FreeTextEntry1] : EKG demonstrated a normal sinus rhythm at a rate of 66. Essentially within normal limits\par \par In summary the patient is a 70-year-old gentleman with known history for CAD, prior two-vessel CABG with occluded graft to the SVG and PCI/stent from September 2020 to circumflex vessel;;\par Stable cardiac pattern with occasional "uncomfortable" feeling in the chest which appears positional and probably not cardiac related;\par \par Plan:\par \par Patient reassured;\par \par Did not feel atypical chest symptoms are cardiac related;\par \par Okay to continue current medical regimen;\par \par Discuss possibility of future nuclear stress test and will discuss my next visit within 3-4 months\par \par Patient report any additional untoward chest symptoms between now and next visit\par \par Continued timely checkups and laboratory blood tests with primary care encouraged;;;

## 2021-04-02 ENCOUNTER — RX RENEWAL (OUTPATIENT)
Age: 71
End: 2021-04-02

## 2021-06-10 ENCOUNTER — NON-APPOINTMENT (OUTPATIENT)
Age: 71
End: 2021-06-10

## 2021-06-10 ENCOUNTER — APPOINTMENT (OUTPATIENT)
Dept: CARDIOLOGY | Facility: CLINIC | Age: 71
End: 2021-06-10
Payer: MEDICARE

## 2021-06-10 VITALS
TEMPERATURE: 98.4 F | BODY MASS INDEX: 30.66 KG/M2 | SYSTOLIC BLOOD PRESSURE: 110 MMHG | WEIGHT: 219 LBS | HEART RATE: 64 BPM | HEIGHT: 71 IN | DIASTOLIC BLOOD PRESSURE: 70 MMHG | RESPIRATION RATE: 16 BRPM

## 2021-06-10 DIAGNOSIS — E05.90 THYROTOXICOSIS, UNSPECIFIED W/OUT THYROTOXIC CRISIS OR STORM: ICD-10-CM

## 2021-06-10 PROCEDURE — 93000 ELECTROCARDIOGRAM COMPLETE: CPT

## 2021-06-10 PROCEDURE — 99214 OFFICE O/P EST MOD 30 MIN: CPT

## 2021-06-10 NOTE — REASON FOR VISIT
[Coronary Artery Disease] : coronary artery disease [FreeTextEntry3] : ROSA STINSON [FreeTextEntry1] : The patient is a 70-year-old white male with a known history for diffuse coronary artery disease and prior remote history for CABG x2 vessels recently required urgent PCI/stent in September 2022 the circumflex vessel after one graft was found to be occluded;\par \par The patient had done reasonably well over the past few months but over the past several weeks has been noticing some nondescript anterior chest pressure tightness discomfort that comes and goes;\par \par He also was diagnosed with having possible celiac disease recently and his GI doctor was considering placing him on a gluten-free diet;

## 2021-06-10 NOTE — HISTORY OF PRESENT ILLNESS
[FreeTextEntry1] : Patient reports he's been taking his medications regularly including his anticoagulation with Eliquis for history of DVT of left leg and likely coagulopathy with prior history for pulmonary embolus many years ago;\par \par

## 2021-06-10 NOTE — REVIEW OF SYSTEMS
[Feeling Fatigued] : feeling fatigued [Chest Discomfort] : chest discomfort [Anxiety] : anxiety [Negative] : Heme/Lymph

## 2021-06-10 NOTE — ASSESSMENT
[FreeTextEntry1] : EKG demonstrated a normal sinus rhythm at a rate of 64. There are no acute changes noted;\par \par In summary this 70-year-old white male has a history for diffuse coronary artery disease, remote CABG and more recent TCI/stent this past fall after occluded graft to circumflex vessel found;\par Recent chest discomfort experienced intermittently of questionable etiology\par \par Plan:\par \par I recommended patient schedule pharmacologic myocardial perfusion stress test sometime in the near future to rule out any additional ongoing ischemia as a cause of this patient's symptoms\par \par Return to office within 3 months or p.r.n.\par \par Patient to report any change or worsening of symptoms between now and stress test;;;;

## 2021-06-10 NOTE — PHYSICAL EXAM
[Well Developed] : well developed [Well Nourished] : well nourished [No Acute Distress] : no acute distress [Normal Conjunctiva] : normal conjunctiva [Normal Venous Pressure] : normal venous pressure [No Carotid Bruit] : no carotid bruit [Normal S1, S2] : normal S1, S2 [No Murmur] : no murmur [No Rub] : no rub [No Gallop] : no gallop [Clear Lung Fields] : clear lung fields [Good Air Entry] : good air entry [Soft] : abdomen soft [No Respiratory Distress] : no respiratory distress  [Non Tender] : non-tender [No Masses/organomegaly] : no masses/organomegaly [Normal Bowel Sounds] : normal bowel sounds [Normal Gait] : normal gait [No Edema] : no edema [No Cyanosis] : no cyanosis [No Clubbing] : no clubbing [No Varicosities] : no varicosities [No Rash] : no rash [No Skin Lesions] : no skin lesions [Moves all extremities] : moves all extremities [Normal Speech] : normal speech [No Focal Deficits] : no focal deficits [Alert and Oriented] : alert and oriented [Normal memory] : normal memory

## 2021-07-01 ENCOUNTER — RX RENEWAL (OUTPATIENT)
Age: 71
End: 2021-07-01

## 2021-08-03 ENCOUNTER — APPOINTMENT (OUTPATIENT)
Dept: CARDIOLOGY | Facility: CLINIC | Age: 71
End: 2021-08-03
Payer: MEDICARE

## 2021-08-03 PROCEDURE — A9500: CPT

## 2021-08-03 PROCEDURE — 78452 HT MUSCLE IMAGE SPECT MULT: CPT

## 2021-08-03 PROCEDURE — 93015 CV STRESS TEST SUPVJ I&R: CPT

## 2021-08-03 RX ORDER — REGADENOSON 0.08 MG/ML
0.4 INJECTION, SOLUTION INTRAVENOUS
Qty: 4 | Refills: 0 | Status: COMPLETED | OUTPATIENT
Start: 2021-08-03

## 2021-08-03 RX ADMIN — REGADENOSON 0 MG/5ML: 0.08 INJECTION, SOLUTION INTRAVENOUS at 00:00

## 2021-08-11 ENCOUNTER — APPOINTMENT (OUTPATIENT)
Dept: CARDIOLOGY | Facility: CLINIC | Age: 71
End: 2021-08-11
Payer: MEDICARE

## 2021-08-11 ENCOUNTER — NON-APPOINTMENT (OUTPATIENT)
Age: 71
End: 2021-08-11

## 2021-08-11 VITALS
DIASTOLIC BLOOD PRESSURE: 70 MMHG | RESPIRATION RATE: 16 BRPM | WEIGHT: 219 LBS | HEART RATE: 62 BPM | BODY MASS INDEX: 30.66 KG/M2 | HEIGHT: 71 IN | SYSTOLIC BLOOD PRESSURE: 112 MMHG

## 2021-08-11 PROCEDURE — 99214 OFFICE O/P EST MOD 30 MIN: CPT

## 2021-08-11 PROCEDURE — 93000 ELECTROCARDIOGRAM COMPLETE: CPT

## 2021-08-11 NOTE — REASON FOR VISIT
[Structural Heart and Valve Disease] : structural heart and valve disease [Hyperlipidemia] : hyperlipidemia [Hypertension] : hypertension [Coronary Artery Disease] : coronary artery disease [FreeTextEntry3] : ROSA BHANDARI [FreeTextEntry1] : The patient is a 70-year-old white male with known history for diffuse CAD and remote history for CABG x2 with PCI/stent (most recent September 2021 to circumflex with one occluded graft found;\par \par Patient has had some atypical chest discomfort from time to time and we recommended a nuclear stress test to assess for change in significant ischemia;\par \par Today he states he's been generally feeling well and been doing a lot of physical chores around the house including mowing the lawn etc. and generally feels well;\par \par

## 2021-08-11 NOTE — HISTORY OF PRESENT ILLNESS
[FreeTextEntry1] : Pharmacologic nuclear stress test from 8/3/21 shows no symptoms of chest pain. Occasional PACs with normal blood pressure blunted response. EKG remained negative for any ST changes;\par Myocardial perfusion imaging demonstrating a small basal inferior defect suggesting more infarct and/or artifact with minimal hypokinesis but preserved LVEF in the range of 66%. There was no evidence of any reversible ischemia-i.e. negative test;

## 2021-08-11 NOTE — ASSESSMENT
[FreeTextEntry1] : EKG shows a normal sinus rhythm at a rate of 62; slight LAD.  no acute changes;\par \par In summary this 70-year-old gentleman has a history of multivessel CAD, prior CABG with recent stent from last September 2021; (after one occluded graft to circumflex was found);\par Stable cardiac pad at this time withno evidence of any reversible ischemia on current recent nuclear stress test;\par \par Plan:\par \par No additional cardiac workup indicated at this time\par \par We'll continue medical management;\par \par Followup to this office within 4-5 months or p.r.n.\par \par Maintain good p.o. hydration this summer encouraged;\par \par Continued timely checkups and laboratory blood tests with primary care;;

## 2021-10-06 RX ORDER — KIT FOR THE PREPARATION OF TECHNETIUM TC99M SESTAMIBI 1 MG/5ML
INJECTION, POWDER, LYOPHILIZED, FOR SOLUTION PARENTERAL
Refills: 0 | Status: COMPLETED | OUTPATIENT
Start: 2021-10-06

## 2021-10-06 RX ADMIN — KIT FOR THE PREPARATION OF TECHNETIUM TC99M SESTAMIBI 0: 1 INJECTION, POWDER, LYOPHILIZED, FOR SOLUTION PARENTERAL at 00:00

## 2021-11-11 ENCOUNTER — RX RENEWAL (OUTPATIENT)
Age: 71
End: 2021-11-11

## 2021-11-24 NOTE — HISTORY OF PRESENT ILLNESS
Pt with vomiting and tactile fevers x 2 days. Pt now c/o abdominal pain. Decrease in PO, 2 urine since this AM. Mother states pt was exposed to COVID at school last week.   
[FreeTextEntry1] : The left leg DVT seemed to begin not long after a long train ride to [place]. This is the same compromise leg that he had problems with prior surgeries in the past for other matters.;\par \par Patient also has a remote history for DVT and pulmonary embolus years ago;\par \par He saw a hematologist who said there was no evidence of any definite coagulopathy recently;\par \par Last nuclear stress test was December 2018 which was negative for ischemia on a pharmacologic protocol;

## 2022-01-19 ENCOUNTER — NON-APPOINTMENT (OUTPATIENT)
Age: 72
End: 2022-01-19

## 2022-01-19 ENCOUNTER — APPOINTMENT (OUTPATIENT)
Dept: CARDIOLOGY | Facility: CLINIC | Age: 72
End: 2022-01-19
Payer: MEDICARE

## 2022-01-19 VITALS
BODY MASS INDEX: 30.66 KG/M2 | DIASTOLIC BLOOD PRESSURE: 79 MMHG | WEIGHT: 219 LBS | RESPIRATION RATE: 16 BRPM | HEART RATE: 57 BPM | SYSTOLIC BLOOD PRESSURE: 119 MMHG | HEIGHT: 71 IN

## 2022-01-19 PROCEDURE — 99214 OFFICE O/P EST MOD 30 MIN: CPT

## 2022-01-19 PROCEDURE — 93000 ELECTROCARDIOGRAM COMPLETE: CPT

## 2022-01-19 NOTE — PHYSICAL EXAM
[Well Developed] : well developed [Well Nourished] : well nourished [No Acute Distress] : no acute distress [Normal Conjunctiva] : normal conjunctiva [Normal Venous Pressure] : normal venous pressure [No Carotid Bruit] : no carotid bruit [Normal S1, S2] : normal S1, S2 [No Murmur] : no murmur [No Rub] : no rub [No Gallop] : no gallop [Clear Lung Fields] : clear lung fields [Good Air Entry] : good air entry [No Respiratory Distress] : no respiratory distress  [Soft] : abdomen soft [Non Tender] : non-tender [No Masses/organomegaly] : no masses/organomegaly [Normal Gait] : normal gait [No Edema] : no edema [No Cyanosis] : no cyanosis [No Clubbing] : no clubbing [No Rash] : no rash [No Skin Lesions] : no skin lesions [Moves all extremities] : moves all extremities [No Focal Deficits] : no focal deficits [Normal Speech] : normal speech [Alert and Oriented] : alert and oriented [Normal memory] : normal memory

## 2022-02-01 ENCOUNTER — APPOINTMENT (OUTPATIENT)
Dept: CARDIOLOGY | Facility: CLINIC | Age: 72
End: 2022-02-01
Payer: MEDICARE

## 2022-02-01 PROCEDURE — 93306 TTE W/DOPPLER COMPLETE: CPT

## 2022-02-01 PROCEDURE — 93880 EXTRACRANIAL BILAT STUDY: CPT

## 2022-03-07 ENCOUNTER — RX RENEWAL (OUTPATIENT)
Age: 72
End: 2022-03-07

## 2022-03-10 NOTE — DISCUSSION/SUMMARY
[FreeTextEntry1] : 1).  Patient will be required to complete updated Transthoracic Echocardiogram and Carotid Doppler study in the near future prior to cardiac clearance.  If these exams are relatively unremarkable with no significant findings of cardiac dysfunction or significant carotid stenosis, he will be cleared from cardiovascular perspective to undergo colonoscopy.\par \par He was instructed that if cleared he may hold Plavix 5-7 days prior to procedure and Eliquis 48 hours prior to procedure and he may restart them thereafter if you the GI Specialist deems it safe.\par \par 2).  For now, he may continue with current cardiac meds as directed.\par \par 3).  Diet and lifestyle modification discussed including low sodium, low fat and low carbohydrate weight reducing diet.  Patient is to continue to implement aerobic exercise regimen 5 days per week.  Keep well PO hydrated.\par \par 4).  Follow up with PCP (Dr. Pederson) regarding routine checkups and blood work including fasting lipid panel.  Forward all testing/lab work to our office. \par \par 5).  Recommend patient report any untoward symptoms. \par \par 6).  Follow up with Dr. Garzon in 3-4 months or PRN.

## 2022-03-10 NOTE — ASSESSMENT
[FreeTextEntry1] : EKG shows a normal sinus bradycardia with rate of 57; slight LAD. no acute changes;\par \par In summary this 71-year-old gentleman has a history of multivessel CAD, prior CABG with recent stent from last September 2020; (after one occluded graft to circumflex was found);\par Stable cardiac pattern at this time with no evidence of any reversible ischemia on current recent nuclear stress test;\par He is now in need of possible cardiac clearance regarding colonoscopy procedure in the near future but not yet scheduled.  Reports he needs instruction on how to manage his Eliquis and Plavix;

## 2022-03-10 NOTE — HISTORY OF PRESENT ILLNESS
[FreeTextEntry1] : Follow up Pharmacologic Nuclear stress test was performed s/p cardiac catheterization last August 2021.  This had shown no symptoms of chest pain. Occasional PACs with normal blood pressure blunted response. EKG remained negative for any ST changes;  Myocardial perfusion imaging demonstrating a small basal inferior defect suggesting more infarct and/or artifact with minimal hypokinesis but preserved LVEF in the range of 66%. There was no evidence of any reversible ischemia-i.e. negative test; \par \par Most recent blood work (11/29/2021):  Glucose (84), HgA1C (5.6%), Creatinine (0.77), AST (21), ALT (15), Total Cholesterol (146), LDL (68), HDL (49);\par \par Tolerating current cardiac medical regimen without difficulty including anticoagulation for thromboembolic prophylaxis (Eliquis 2.5 mg BID- due to history for blood clots), Plavix daily, Metoprolol Succinate 50 mg QD and Crestor 10 mg QHS;\par \par He now reports he may be undergoing a colonoscopy procedure in the near future but is not yet scheduled.  This will most likely be with Dr. Daniel Alejandre in [place], [place].  He will most likely need cardiac clearance as well as instruction for taking Eliquis and Plavix;

## 2022-03-10 NOTE — REASON FOR VISIT
[FreeTextEntry1] : RYAN TURCIOS is being seen for structural heart and valve disease, hyperlipidemia, hypertension and coronary artery disease. \par \par The patient is a 71-year-old white male with known history for diffuse CAD and remote history for CABG x2 with PCI/stent (most recent September 2020 to circumflex with one occluded graft found);\par \par Patient had some atypical chest discomfort from time to time back in 2020 and we recommended a nuclear stress test to assess for change in significant ischemia.  This found a small area of ischemia which prompted cardiac cath;\par \par Today he states he's been generally feeling well and been doing a lot aerobic exercise 5 days per week on the treadmill without complaints or problems. He denies CP, SOB, MOSELEY, palpitations, presyncope, syncope, edema.

## 2022-03-10 NOTE — H&P PST ADULT - BACK
Impression: Central retinal vein occlusion, right eye, with macular edema: H34.8110.  Plan: inject Avastin OD 

RTC 1 month ND Avastin OD No deformity or limitation of movement

## 2022-03-10 NOTE — ADDENDUM
[FreeTextEntry1] : Most recent echocardiogram and carotid doppler mostly unchanged from prior studies and do not demonstrate any significant decrease in overall cardiac function or carotid plaquing respectively.\par \par 1).  Based upon Mr. Jeffrey Hudson's otherwise stable cardiac pattern, there is no absolute cardiac contraindication for him to undergo the proposed colonoscopy procedure. \par \par Once again, he may hold Plavix 5-7 days prior to procedure and Eliquis 48 hours prior to procedure and he may restart them thereafter if you deem it safe.\par \par If I may be of additional assistance, please do not hesitate to call.

## 2022-05-04 ENCOUNTER — APPOINTMENT (OUTPATIENT)
Dept: CARDIOLOGY | Facility: CLINIC | Age: 72
End: 2022-05-04
Payer: MEDICARE

## 2022-05-04 ENCOUNTER — NON-APPOINTMENT (OUTPATIENT)
Age: 72
End: 2022-05-04

## 2022-05-04 VITALS
SYSTOLIC BLOOD PRESSURE: 122 MMHG | BODY MASS INDEX: 30.24 KG/M2 | RESPIRATION RATE: 16 BRPM | HEIGHT: 71 IN | DIASTOLIC BLOOD PRESSURE: 70 MMHG | HEART RATE: 58 BPM | WEIGHT: 216 LBS

## 2022-05-04 DIAGNOSIS — R07.9 CHEST PAIN, UNSPECIFIED: ICD-10-CM

## 2022-05-04 PROCEDURE — 99214 OFFICE O/P EST MOD 30 MIN: CPT

## 2022-05-04 PROCEDURE — 93000 ELECTROCARDIOGRAM COMPLETE: CPT

## 2022-05-04 NOTE — REASON FOR VISIT
[Arrhythmia/ECG Abnorrmalities] : arrhythmia/ECG abnormalities [Structural Heart and Valve Disease] : structural heart and valve disease [Hyperlipidemia] : hyperlipidemia [Coronary Artery Disease] : coronary artery disease [Carotid, Aortic and Peripheral Vascular Disease] : carotid, aortic and peripheral vascular disease [FreeTextEntry3] : ROSA STINSON [FreeTextEntry1] : The patient is a 71-year-old white male who has a history for CAD, remote CABG x2 vessels and prior PCI/stent (most recent September 2020-circumflex. One occluded graft found);\par \par He returns to the office today for general cardiac checkup, and to discuss results of recent cardiovascular testing;\par \par Overall, patient has had no cardiac complaints for chest pain, shortness of breath, palpitations or dizziness;\par \par \par \par

## 2022-05-04 NOTE — ASSESSMENT
[FreeTextEntry1] : EKG shows normal sinus rhythm at a rate of 58; nonspecific ST-T changes. No acute changes;\par \par In summary, the patient is a 71-year-old gentleman with diffuse coronary disease, prior remote CABG and coronary stents. He has had a recent nuclear stress test in August 2021 which demonstrated no significant ischemia.;\par \par Plan:\par \par No additional cardiac workup indicated at this time;\par \par Patient instructed to continue current medical regimen including anticoagulation for history of DVT;\par \par Return to office within 4 months or p.r.n.;\par \par It would be no absolute cardiac contraindication for additional dental surgical procedure.;\par (May hold anticoagulant for 48 hours and hold Plavix for 5 days prior to procedures;)\par \par

## 2022-05-04 NOTE — PHYSICAL EXAM
[Well Developed] : well developed [Well Nourished] : well nourished [No Acute Distress] : no acute distress [Normal Conjunctiva] : normal conjunctiva [Normal Venous Pressure] : normal venous pressure [No Carotid Bruit] : no carotid bruit [Normal S1, S2] : normal S1, S2 [No Rub] : no rub [No Gallop] : no gallop [Clear Lung Fields] : clear lung fields [Good Air Entry] : good air entry [No Respiratory Distress] : no respiratory distress  [Soft] : abdomen soft [Non Tender] : non-tender [No Masses/organomegaly] : no masses/organomegaly [Normal Bowel Sounds] : normal bowel sounds [Normal Gait] : normal gait [No Edema] : no edema [No Cyanosis] : no cyanosis [No Clubbing] : no clubbing [No Varicosities] : no varicosities [No Rash] : no rash [No Skin Lesions] : no skin lesions [Moves all extremities] : moves all extremities [No Focal Deficits] : no focal deficits [Normal Speech] : normal speech [Alert and Oriented] : alert and oriented [Normal memory] : normal memory [de-identified] : Regular rhythm, grade 2-3/6 systolic ejection murmur increased at aortic focus;  A-2;

## 2022-05-04 NOTE — HISTORY OF PRESENT ILLNESS
[FreeTextEntry1] : He has experienced some other somatic complaints such as Double dental root infections, which apparently required draining and antibiotics, and will likely need further dental procedures in the near future;\par \par He's been tolerating his current medical regimen without difficulty;\par Carotid duplex study from February 2022 shows minimal calcified and heterogeneous plaquingon the left side and mild calcified heterogeneous plaque on the right side. Otherwise normal flow;\par \par Echocardiogram from 2/1/22 showed mild LVH with preserved LVEF at 60-65%. Mild diastolic dysfunction. Enlarged left atrium and right atrium RV enlargement. Moderate aortic stenosis with mild AI and mild to moderate TR mild PI and mild elevated PA pressures;\par \par

## 2022-06-02 ENCOUNTER — RX RENEWAL (OUTPATIENT)
Age: 72
End: 2022-06-02

## 2022-06-03 ENCOUNTER — RX RENEWAL (OUTPATIENT)
Age: 72
End: 2022-06-03

## 2022-08-24 ENCOUNTER — APPOINTMENT (OUTPATIENT)
Dept: CARDIOLOGY | Facility: CLINIC | Age: 72
End: 2022-08-24

## 2022-08-24 VITALS — SYSTOLIC BLOOD PRESSURE: 110 MMHG | DIASTOLIC BLOOD PRESSURE: 70 MMHG

## 2022-08-24 DIAGNOSIS — R07.89 OTHER CHEST PAIN: ICD-10-CM

## 2022-08-24 PROCEDURE — 93000 ELECTROCARDIOGRAM COMPLETE: CPT

## 2022-08-24 PROCEDURE — 99214 OFFICE O/P EST MOD 30 MIN: CPT

## 2022-08-24 NOTE — PHYSICAL EXAM
[Well Developed] : well developed [Well Nourished] : well nourished [No Acute Distress] : no acute distress [Normal Conjunctiva] : normal conjunctiva [Normal Venous Pressure] : normal venous pressure [No Carotid Bruit] : no carotid bruit [Normal S1, S2] : normal S1, S2 [No Rub] : no rub [No Gallop] : no gallop [Clear Lung Fields] : clear lung fields [Good Air Entry] : good air entry [No Respiratory Distress] : no respiratory distress  [Soft] : abdomen soft [Non Tender] : non-tender [No Masses/organomegaly] : no masses/organomegaly [Normal Bowel Sounds] : normal bowel sounds [Normal Gait] : normal gait [No Edema] : no edema [No Cyanosis] : no cyanosis [No Clubbing] : no clubbing [No Varicosities] : no varicosities [No Rash] : no rash [No Skin Lesions] : no skin lesions [Moves all extremities] : moves all extremities [No Focal Deficits] : no focal deficits [Normal Speech] : normal speech [Alert and Oriented] : alert and oriented [Normal memory] : normal memory [de-identified] : Regular rhythm, grade 2-3/6 systolic ejection murmur increased at aortic focus;  A-2;

## 2022-08-24 NOTE — HISTORY OF PRESENT ILLNESS
[FreeTextEntry1] : He has experienced some other somatic complaints such as severe arthritic left knee for which he has received gel shots x3 with some improvement lately;\par Additional complaints of prostatic hypertrophy symptoms for which she is following up with urology in the near future;\par \par He's been tolerating his current medical regimen without difficulty;\par Carotid duplex study from February 2022 shows minimal calcified and heterogeneous plaquingon the left side and mild calcified heterogeneous plaque on the right side. Otherwise normal flow;\par \par Echocardiogram from 2/1/22 showed mild LVH with preserved LVEF at 60-65%. Mild diastolic dysfunction. Enlarged left atrium and right atrium RV enlargement. Moderate aortic stenosis with mild AI and mild to moderate TR mild PI and mild elevated PA pressures;\par \par

## 2022-08-24 NOTE — REVIEW OF SYSTEMS
[Tooth Pain] : tooth pain [Urinary Frequency] : urinary frequency [Joint Pain] : joint pain [Joint Stiffness] : joint stiffness [Knee Problem] : knee problems [Knee Pain] : knee pain [Negative] : Heme/Lymph

## 2022-08-24 NOTE — ASSESSMENT
[FreeTextEntry1] : EKG shows normal sinus rhythm at a rate of 72; nonspecific ST-T changes. No acute changes;\par \par In summary, the patient is a 71-year-old gentleman with diffuse coronary disease, prior remote CABG and coronary stents. He has had a recent nuclear stress test in August 2021 which demonstrated no significant \par ischemia.;  Few other somatic complaints for left knee and BPH symptoms but otherwise appears cardiac stable;\par \par Plan:\par \par No additional cardiac workup indicated at this time;\par \par Patient instructed to continue current medical regimen including anticoagulation for history of DVT;\par \par Return to office within 4 months or p.r.n.;\par \par Continue current medical regimen, with regular checkups and laboratory blood test with primary care encouraged;\par \par

## 2022-08-30 ENCOUNTER — RX RENEWAL (OUTPATIENT)
Age: 72
End: 2022-08-30

## 2022-09-26 ENCOUNTER — RX RENEWAL (OUTPATIENT)
Age: 72
End: 2022-09-26

## 2022-11-28 ENCOUNTER — RX RENEWAL (OUTPATIENT)
Age: 72
End: 2022-11-28

## 2023-01-12 ENCOUNTER — APPOINTMENT (OUTPATIENT)
Dept: CARDIOLOGY | Facility: CLINIC | Age: 73
End: 2023-01-12
Payer: MEDICARE

## 2023-01-12 ENCOUNTER — NON-APPOINTMENT (OUTPATIENT)
Age: 73
End: 2023-01-12

## 2023-01-12 VITALS
BODY MASS INDEX: 29.82 KG/M2 | RESPIRATION RATE: 16 BRPM | WEIGHT: 213 LBS | DIASTOLIC BLOOD PRESSURE: 72 MMHG | SYSTOLIC BLOOD PRESSURE: 130 MMHG | HEIGHT: 71 IN | HEART RATE: 59 BPM

## 2023-01-12 PROCEDURE — 93000 ELECTROCARDIOGRAM COMPLETE: CPT

## 2023-01-12 PROCEDURE — 99214 OFFICE O/P EST MOD 30 MIN: CPT

## 2023-01-12 RX ORDER — AMOXICILLIN 500 MG/1
500 CAPSULE ORAL 3 TIMES DAILY
Refills: 0 | Status: DISCONTINUED | COMMUNITY
End: 2023-01-12

## 2023-01-12 NOTE — PHYSICAL EXAM
[Well Developed] : well developed [Well Nourished] : well nourished [No Acute Distress] : no acute distress [Normal Conjunctiva] : normal conjunctiva [Normal Venous Pressure] : normal venous pressure [No Carotid Bruit] : no carotid bruit [Normal S1, S2] : normal S1, S2 [No Rub] : no rub [No Gallop] : no gallop [Clear Lung Fields] : clear lung fields [Good Air Entry] : good air entry [No Respiratory Distress] : no respiratory distress  [Soft] : abdomen soft [Non Tender] : non-tender [No Masses/organomegaly] : no masses/organomegaly [Normal Bowel Sounds] : normal bowel sounds [Normal Gait] : normal gait [No Edema] : no edema [No Cyanosis] : no cyanosis [No Clubbing] : no clubbing [No Varicosities] : no varicosities [No Rash] : no rash [No Skin Lesions] : no skin lesions [Moves all extremities] : moves all extremities [No Focal Deficits] : no focal deficits [Normal Speech] : normal speech [Alert and Oriented] : alert and oriented [Normal memory] : normal memory [de-identified] : Regular rhythm, grade 2-3/6 systolic ejection murmur increased at aortic focus;  A-2;

## 2023-01-12 NOTE — ASSESSMENT
[FreeTextEntry1] : EKG shows normal sinus rhythm at a rate of 59;  nonspecific ST-T changes. No acute changes;\par \par In summary, the patient is a 72-year-old gentleman with diffuse coronary disease, prior remote CABG and coronary stents. He has had a recent nuclear stress test in August 2021 which demonstrated no significant \par ischemia.;  Few other somatic complaints for left knee and BPH symptoms but otherwise appears cardiac stable;\par Additional somatic complaints such as right thumb pain after injury using an electric saw; and also chronic left knee problem which may require knee replacement surgery sometime in the future but nothing scheduled yet;\par Presently, hemodynamically stable;\par \par \par Plan:\par \par Recommend transthoracic echocardiogram prior to next visit within 4 to 5 months;\par \par Patient instructed to continue current medical regimen including anticoagulation for history of DVT;\par \par Return to office within 4 months or p.r.n.;\par \par Continue current medical regimen, with regular checkups and laboratory blood test with primary care encouraged;\par \par

## 2023-01-12 NOTE — REVIEW OF SYSTEMS
[Tooth Pain] : tooth pain [Urinary Frequency] : urinary frequency [Joint Pain] : joint pain [Joint Stiffness] : joint stiffness [Finger Problem] : finger problems [Finger Pain] : pain in the finger [Knee Problem] : knee problems [Knee Pain] : knee pain [Negative] : Heme/Lymph

## 2023-01-12 NOTE — REASON FOR VISIT
[Arrhythmia/ECG Abnorrmalities] : arrhythmia/ECG abnormalities [Structural Heart and Valve Disease] : structural heart and valve disease [Hyperlipidemia] : hyperlipidemia [Coronary Artery Disease] : coronary artery disease [Carotid, Aortic and Peripheral Vascular Disease] : carotid, aortic and peripheral vascular disease [FreeTextEntry3] : ROSA STINSON [FreeTextEntry1] : The patient is a 72-year-old white male who has a history for CAD, remote CABG x2 vessels and prior PCI/stent (most recent September 2020-circumflex. One occluded graft found); Also heart murmur of moderate aortic stenosis as also found on last echocardiogram;\par \par He returns to the office today for general cardiac checkup, and to discuss results of recent cardiovascular testing;\par \par Overall, patient has had no cardiac complaints for chest pain, shortness of breath, palpitations or dizziness;\par \par \par \par

## 2023-01-12 NOTE — HISTORY OF PRESENT ILLNESS
[FreeTextEntry1] : He has experienced some other somatic complaints such as severe arthritic left knee for which he has received gel shots x3 with some improvement lately;\par He also underwent GI work-up because of unexplained weight loss recently but findings were "unrevealing" and not worrisome and his weight has stabilized;\par \par Additional somatic complaints include an injury to his right thumb base which may require hand surgery for evaluation for which he is seeking in the near future; to get he has "bone on bone" issues with his left knee and may require future knee replacement surgery;\par \par He's been tolerating his current medical regimen without difficulty;\par Carotid duplex study from February 2022 shows minimal calcified and heterogeneous plaquing on the left side and mild calcified heterogeneous plaque on the right side. Otherwise normal flow;\par \par Echocardiogram from 2/1/22 showed mild LVH with preserved LVEF at 60-65%. Mild diastolic dysfunction. Enlarged left atrium and right atrium RV enlargement. Moderate aortic stenosis with mild AI and mild to moderate TR mild PI and mild elevated PA pressures;\par \par Last nuclear stress test was from 2021 and demonstrated some moderately fixed perfusion defects in the inferior basilar wall of the left ventricle with preserved left ventricular systolic ejection fraction and no significant ischemia noted;

## 2023-02-24 ENCOUNTER — RX RENEWAL (OUTPATIENT)
Age: 73
End: 2023-02-24

## 2023-03-14 ENCOUNTER — APPOINTMENT (OUTPATIENT)
Dept: CARDIOLOGY | Facility: CLINIC | Age: 73
End: 2023-03-14
Payer: MEDICARE

## 2023-03-14 PROCEDURE — 93306 TTE W/DOPPLER COMPLETE: CPT

## 2023-03-21 ENCOUNTER — OFFICE (OUTPATIENT)
Dept: URBAN - METROPOLITAN AREA CLINIC 115 | Facility: CLINIC | Age: 73
Setting detail: OPHTHALMOLOGY
End: 2023-03-21
Payer: MEDICARE

## 2023-03-21 DIAGNOSIS — H01.012: ICD-10-CM

## 2023-03-21 DIAGNOSIS — H43.812: ICD-10-CM

## 2023-03-21 DIAGNOSIS — H01.015: ICD-10-CM

## 2023-03-21 DIAGNOSIS — H35.033: ICD-10-CM

## 2023-03-21 DIAGNOSIS — H01.014: ICD-10-CM

## 2023-03-21 DIAGNOSIS — H52.4: ICD-10-CM

## 2023-03-21 DIAGNOSIS — H01.011: ICD-10-CM

## 2023-03-21 PROCEDURE — 92014 COMPRE OPH EXAM EST PT 1/>: CPT | Performed by: OPHTHALMOLOGY

## 2023-03-21 ASSESSMENT — REFRACTION_MANIFEST
OS_ADD: +2.25
OD_ADD: +2.25
OD_CYLINDER: -0.50
OD_SPHERE: +0.50
OS_CYLINDER: -1.00
OD_AXIS: 090
OS_CYLINDER: -1.00
OS_SPHERE: +0.50
OD_VA1: 20/20
OD_CYLINDER: -0.50
OD_SPHERE: +2.75
OS_AXIS: 150
OS_VA1: 20/20
OS_SPHERE: +2.75
OS_AXIS: 150
OD_AXIS: 090

## 2023-03-21 ASSESSMENT — SPHEQUIV_DERIVED
OD_SPHEQUIV: 0
OD_SPHEQUIV: 2.5
OS_SPHEQUIV: 2.25
OS_SPHEQUIV: 0
OD_SPHEQUIV: 0.25
OS_SPHEQUIV: 0

## 2023-03-21 ASSESSMENT — REFRACTION_CURRENTRX
OS_SPHERE: +2.25
OD_SPHERE: +2.25
OS_OVR_VA: 20/
OD_OVR_VA: 20/

## 2023-03-21 ASSESSMENT — REFRACTION_AUTOREFRACTION
OS_AXIS: 146
OD_CYLINDER: -0.50
OD_AXIS: 074
OD_SPHERE: +0.25
OS_CYLINDER: -0.50
OS_SPHERE: +0.25

## 2023-03-21 ASSESSMENT — VISUAL ACUITY
OS_BCVA: 20/20
OD_BCVA: 20/20

## 2023-03-21 ASSESSMENT — CONFRONTATIONAL VISUAL FIELD TEST (CVF)
OS_FINDINGS: FULL
OD_FINDINGS: FULL

## 2023-03-21 ASSESSMENT — TONOMETRY
OD_IOP_MMHG: 19
OS_IOP_MMHG: 18

## 2023-03-21 ASSESSMENT — LID EXAM ASSESSMENTS
OD_BLEPHARITIS: RLL RUL T
OS_BLEPHARITIS: LLL LUL T

## 2023-04-07 ENCOUNTER — APPOINTMENT (OUTPATIENT)
Dept: CARDIOLOGY | Facility: CLINIC | Age: 73
End: 2023-04-07

## 2023-04-12 ENCOUNTER — NON-APPOINTMENT (OUTPATIENT)
Age: 73
End: 2023-04-12

## 2023-04-12 ENCOUNTER — APPOINTMENT (OUTPATIENT)
Dept: CARDIOLOGY | Facility: CLINIC | Age: 73
End: 2023-04-12
Payer: MEDICARE

## 2023-04-12 VITALS
RESPIRATION RATE: 16 BRPM | DIASTOLIC BLOOD PRESSURE: 72 MMHG | HEIGHT: 71 IN | BODY MASS INDEX: 29.54 KG/M2 | SYSTOLIC BLOOD PRESSURE: 110 MMHG | HEART RATE: 59 BPM | WEIGHT: 211 LBS

## 2023-04-12 PROCEDURE — 99214 OFFICE O/P EST MOD 30 MIN: CPT

## 2023-04-12 PROCEDURE — 93000 ELECTROCARDIOGRAM COMPLETE: CPT

## 2023-04-12 RX ORDER — APIXABAN 2.5 MG/1
2.5 TABLET, FILM COATED ORAL
Qty: 60 | Refills: 0 | Status: ACTIVE | COMMUNITY

## 2023-04-12 NOTE — PHYSICAL EXAM
[Well Developed] : well developed [Well Nourished] : well nourished [No Acute Distress] : no acute distress [Normal Conjunctiva] : normal conjunctiva [Normal Venous Pressure] : normal venous pressure [No Carotid Bruit] : no carotid bruit [Normal S1, S2] : normal S1, S2 [No Rub] : no rub [No Gallop] : no gallop [Clear Lung Fields] : clear lung fields [Good Air Entry] : good air entry [No Respiratory Distress] : no respiratory distress  [Soft] : abdomen soft [Non Tender] : non-tender [No Masses/organomegaly] : no masses/organomegaly [Normal Gait] : normal gait [No Edema] : no edema [No Cyanosis] : no cyanosis [No Clubbing] : no clubbing [No Rash] : no rash [No Skin Lesions] : no skin lesions [Moves all extremities] : moves all extremities [No Focal Deficits] : no focal deficits [Normal Speech] : normal speech [Alert and Oriented] : alert and oriented [Normal memory] : normal memory [de-identified] : Grade II/VI to III/VI systolic ejection murmur increased at aortic focus; A-2 preserved.

## 2023-04-12 NOTE — HISTORY OF PRESENT ILLNESS
[FreeTextEntry1] : He has experienced some other somatic complaints such as severe arthritic left knee for which he has received gel shots x3 with some improvement lately;\par He also underwent GI work-up because of unexplained weight loss recently but findings were "unrevealing" and not worrisome and his weight has stabilized;\par \par Discussed his "bone on bone" issues with his left knee and may require future knee replacement surgery;\par \par He's been tolerating his current medical regimen without difficulty;\par \par Most recent Echocardiogram from 03/14/2023 showed mild LVH with preserved LVEF at 55 to 60%. Mild diastolic dysfunction. Enlarged left atrium and right atrium. Moderate RV enlargement. Moderate to severe aortic stenosis with AoV Area (0.94 cm^2, was 1.30 cm^2) and AoV mean PG (17.0 mmHg, was 15.0 mmHg).  Mild ascending aorta dilation stable at (4.1 cm).  There is mild AI and mild to moderate TR, trace PI and mild elevated PA pressures;\par \par Carotid duplex study from February 2022 shows minimal calcified and heterogeneous plaquing on the left side and mild calcified heterogeneous plaque on the right side. Otherwise normal flow;\par \par Last nuclear stress test was from 2021 and demonstrated some moderately fixed perfusion defects in the inferior basilar wall of the left ventricle with preserved left ventricular systolic ejection fraction and no significant ischemia noted;

## 2023-04-12 NOTE — REASON FOR VISIT
[FreeTextEntry1] : RYAN TURCIOS is being seen for arrhythmia/ECG abnormalities, structural heart and valve disease, hyperlipidemia, coronary artery disease and carotid, aortic and peripheral vascular disease. \par \par The patient is a 72-year-old white male who has a history for CAD, remote CABG x2 vessels and prior PCI/stent (most recent September 2020-circumflex. One occluded graft found); Also heart murmur of moderate aortic stenosis as also found on last echocardiogram;\par \par He returns to the office today for general cardiac checkup, and to discuss results of recent cardiovascular testing;\par \par Overall, patient has been very active working around his yard mowing grass and fertilizing without complications or symptoms and has had no cardiac complaints for chest pain, shortness of breath, palpitations or dizziness;

## 2023-04-12 NOTE — ASSESSMENT
[FreeTextEntry1] : EKG shows normal bradycardia at a rate of 59;  left atrial enlargement pattern; leftward axis; early R wave transition V1 to V2; nonspecific T wave abnormality. No acute changes;\par \par In summary, the patient is a 72-year-old gentleman with diffuse coronary disease, prior remote CABG and coronary stents. He has had a recent nuclear stress test in August 2021 which demonstrated no significant \par ischemia.; Few other somatic complaints for left knee and GI symptoms but otherwise appears cardiac stable;\par Additional somatic complaints such as chronic left knee problem which may require knee replacement surgery sometime in the future but nothing scheduled yet;\par Presently, hemodynamically stable;\par \par Completed recent echocardiogram which demonstrated a progressively worsening aortic stenosis, now reporting to be moderate to severe, whereas it was just moderate one year ago.  AoV Area (0.94 cm^2, was 1.30 cm^2) and AoV mean PG (17.0 mmHg, was 15.0 mmHg). There is also preserved LV function and some mild ascending aorta dilation stable at (4.1 cm).  There is mild AI and mild to moderate TR, trace PI and mild elevated PA pressures;\par \par Mr. Hudson remains physically active with working around his yard mowing grass and fertilizing without complications or symptoms, and has had no cardiac complaints for chest pain, shortness of breath, palpitations or dizziness;\par \par \par Plan:\par \par Recommend he monitor himself for symptoms changes and notify our office immediately if he develops worsening shortness of breath, lightheadedness, fatigue, or chest pain.  Will consider repeating echocardiogram around six months or sooner to reassess;\par \par Patient instructed to continue current medical regimen including anticoagulation for history of DVT;\par \par Return to office with Dr. Garzon within 4 to 5 months or p.r.n.;\par \par Continue current medical regimen, with regular checkups and laboratory blood test with primary care encouraged;

## 2023-07-17 ENCOUNTER — NON-APPOINTMENT (OUTPATIENT)
Age: 73
End: 2023-07-17

## 2023-07-17 ENCOUNTER — APPOINTMENT (OUTPATIENT)
Dept: CARDIOLOGY | Facility: CLINIC | Age: 73
End: 2023-07-17
Payer: MEDICARE

## 2023-07-17 VITALS
HEART RATE: 63 BPM | WEIGHT: 206 LBS | BODY MASS INDEX: 28.84 KG/M2 | HEIGHT: 71 IN | DIASTOLIC BLOOD PRESSURE: 84 MMHG | RESPIRATION RATE: 16 BRPM | SYSTOLIC BLOOD PRESSURE: 122 MMHG

## 2023-07-17 PROCEDURE — 93000 ELECTROCARDIOGRAM COMPLETE: CPT

## 2023-07-17 PROCEDURE — 99214 OFFICE O/P EST MOD 30 MIN: CPT

## 2023-07-17 NOTE — REASON FOR VISIT
[FreeTextEntry1] : RYAN TURCIOS is being seen for arrhythmia/ECG abnormalities, structural heart and valve disease, hyperlipidemia, coronary artery disease and carotid, aortic and peripheral vascular disease. \par \par The patient is a 72-year-old white male who has a history for CAD, remote CABG x2 vessels and prior PCI/stent (most recent September 2020-circumflex. One occluded graft found); Also heart murmur of moderate aortic stenosis as also found on last echocardiogram;\par \par He returns to the office today for general cardiac checkup, and now in need of cardiac clearance regarding total left knee replacement surgery scheduled for August 10, 2023 with Dr. Zenon Orantes at [facility] in [place], [place].; \par \par Overall, patient has been very active working around his yard mowing grass and fertilizing without complications or symptoms and has had no cardiac complaints for chest pain, shortness of breath, palpitations or dizziness;

## 2023-07-17 NOTE — HISTORY OF PRESENT ILLNESS
[FreeTextEntry1] : Apparently, he consulted his Vascular Surgeon (Dr. Zenon Do) in regards to his upcoming knee replacement surgery.  Dr. Do recommended he first undergo an IVC Filter placement prior to his orthopedic surgery.  This is scheduled to be done on July 28, 2023 with Dr. Do; \par \par He has experienced some other somatic complaints such as severe arthritic left knee for which he has received gel shots x3 with some improvement in the past;\par He also underwent GI work-up because of unexplained weight loss recently but findings were "unrevealing" and not worrisome and his weight has stabilized;\par \par Discussed his "bone on bone" issues with his left knee and may require future knee replacement surgery;\par \par He's been tolerating his current medical regimen without difficulty;\par \par Most recent Echocardiogram from 03/14/2023 showed mild LVH with preserved LVEF at 55 to 60%. Mild diastolic dysfunction. Enlarged left atrium and right atrium. Moderate RV enlargement. Moderate to severe aortic stenosis with AoV Area (0.94 cm^2, was 1.30 cm^2) and AoV mean PG (17.0 mmHg, was 15.0 mmHg). Mild ascending aorta dilation stable at (4.1 cm). There is mild AI and mild to moderate TR, trace PI and mild elevated PA pressures;\par \par Carotid duplex study from February 2022 shows minimal calcified and heterogeneous plaquing on the left side and mild calcified heterogeneous plaque on the right side. Otherwise normal flow;\par \par Last nuclear stress test was from 2021 and demonstrated some moderately fixed perfusion defects in the inferior basilar wall of the left ventricle with preserved left ventricular systolic ejection fraction and no significant ischemia noted;

## 2023-07-17 NOTE — PHYSICAL EXAM
[Well Developed] : well developed [Well Nourished] : well nourished [No Acute Distress] : no acute distress [Normal Conjunctiva] : normal conjunctiva [Normal Venous Pressure] : normal venous pressure [No Carotid Bruit] : no carotid bruit [Normal S1, S2] : normal S1, S2 [No Rub] : no rub [No Gallop] : no gallop [Clear Lung Fields] : clear lung fields [Good Air Entry] : good air entry [No Respiratory Distress] : no respiratory distress  [Soft] : abdomen soft [Non Tender] : non-tender [No Masses/organomegaly] : no masses/organomegaly [Normal Gait] : normal gait [No Edema] : no edema [No Cyanosis] : no cyanosis [No Clubbing] : no clubbing [No Rash] : no rash [No Skin Lesions] : no skin lesions [Moves all extremities] : moves all extremities [No Focal Deficits] : no focal deficits [Normal Speech] : normal speech [Alert and Oriented] : alert and oriented [Normal memory] : normal memory [de-identified] : Grade II/VI to III/VI systolic ejection murmur increased at aortic focus; A-2 preserved.

## 2023-07-17 NOTE — ASSESSMENT
[FreeTextEntry1] : EKG shows normal sinus rhythm at a rate of 63; left atrial enlargement pattern; leftward axis; early R wave transition V1 to V2; nonspecific T wave abnormality. No acute changes;\par \par In summary, the patient is a 72-year-old gentleman with known  coronary disease, prior remote CABG and coronary stents. He has had a nuclear stress test in August 2021 which demonstrated no significant \par ischemia.; Few other somatic complaints for left knee and GI symptoms but otherwise appears cardiac stable;\par Additional somatic complaints such as chronic left knee problem of which he is now scheduled for  knee replacement surgery next month;\par Presently, hemodynamically stable;\par \par Prior to undergoing left knee replacement surgery, his Vascular Surgeon (Dr. Zenon Do) recommended he first undergo IVC Filter placement which is now scheduled to be done on July 28, 2023; \par \par Completed recent echocardiogram (March 2023) which demonstrated a progressively worsening aortic stenosis, now reporting to be moderate to severe, whereas it was just moderate one year ago. AoV Area (0.94 cm^2, was 1.30 cm^2) and AoV mean PG (17.0 mmHg, was 15.0 mmHg). There is also preserved LV function and some mild ascending aorta dilation stable at (4.1 cm). There is mild AI and mild to moderate TR, trace PI and mild elevated PA pressures;\par \par Mr. Hudson remains physically active with working around his yard mowing grass and fertilizing without complications or symptoms, and has had no cardiac complaints for chest pain, shortness of breath, palpitations or dizziness;\par \par \par Plan:\par \par We will continue to monitor patient clinically for any untoward chest symptoms in the future and on periodic cardiac testing for his valvular heart disease and coronary disease -as discussed in the office today;\par \par Based upon Mr. Conor Hudson's otherwise stable cardiac pattern, there is no absolute cardiac contraindication for him to undergo the proposed knee replacement surgery;\par \par We recommend full cardiac precautions with perioperative cardiac monitoring until stable;\par \par He may hold Plavix five to seven days prior to procedure and restart thereafter if you deem it safe;\par \par As vascular surgery (Dr. Zenon Do) is recommending he undergo IVC Filter placement prior to undergoing knee replacement surgery, will defer to Dr Do for instructions on holding anticoagulation for thromboembolic prophylaxis (Eliquis) prior to his orthopedic surgery;  \par \par If I may be of additional assistance, please do not hesitate to call; \par \par Return to office with Dr. Garzon within 4  months or p.r.n.;\par .

## 2023-07-18 ENCOUNTER — APPOINTMENT (OUTPATIENT)
Dept: DERMATOLOGY | Facility: CLINIC | Age: 73
End: 2023-07-18
Payer: MEDICARE

## 2023-07-18 PROCEDURE — 99204 OFFICE O/P NEW MOD 45 MIN: CPT | Mod: 25

## 2023-07-18 PROCEDURE — 11901 INJECT SKIN LESIONS >7: CPT

## 2023-08-28 ENCOUNTER — RX RENEWAL (OUTPATIENT)
Age: 73
End: 2023-08-28

## 2023-08-29 ENCOUNTER — APPOINTMENT (OUTPATIENT)
Dept: CARDIOLOGY | Facility: CLINIC | Age: 73
End: 2023-08-29

## 2023-08-31 ENCOUNTER — APPOINTMENT (OUTPATIENT)
Dept: DERMATOLOGY | Facility: CLINIC | Age: 73
End: 2023-08-31

## 2023-09-25 ENCOUNTER — RX RENEWAL (OUTPATIENT)
Age: 73
End: 2023-09-25

## 2023-09-25 RX ORDER — ROSUVASTATIN CALCIUM 10 MG/1
10 TABLET, FILM COATED ORAL
Qty: 90 | Refills: 3 | Status: ACTIVE | COMMUNITY
Start: 2019-05-09 | End: 1900-01-01

## 2023-10-31 ENCOUNTER — APPOINTMENT (OUTPATIENT)
Dept: CARDIOLOGY | Facility: CLINIC | Age: 73
End: 2023-10-31
Payer: MEDICARE

## 2023-10-31 ENCOUNTER — NON-APPOINTMENT (OUTPATIENT)
Age: 73
End: 2023-10-31

## 2023-10-31 VITALS
SYSTOLIC BLOOD PRESSURE: 147 MMHG | WEIGHT: 200 LBS | RESPIRATION RATE: 16 BRPM | HEART RATE: 55 BPM | BODY MASS INDEX: 28 KG/M2 | HEIGHT: 71 IN | DIASTOLIC BLOOD PRESSURE: 84 MMHG

## 2023-10-31 DIAGNOSIS — R94.39 ABNORMAL RESULT OF OTHER CARDIOVASCULAR FUNCTION STUDY: ICD-10-CM

## 2023-10-31 PROCEDURE — 93000 ELECTROCARDIOGRAM COMPLETE: CPT

## 2023-10-31 PROCEDURE — 99214 OFFICE O/P EST MOD 30 MIN: CPT

## 2023-11-02 ENCOUNTER — APPOINTMENT (OUTPATIENT)
Dept: SURGICAL ONCOLOGY | Facility: CLINIC | Age: 73
End: 2023-11-02
Payer: MEDICARE

## 2023-11-02 VITALS
TEMPERATURE: 97.7 F | SYSTOLIC BLOOD PRESSURE: 152 MMHG | HEIGHT: 71 IN | OXYGEN SATURATION: 99 % | WEIGHT: 200 LBS | BODY MASS INDEX: 28 KG/M2 | DIASTOLIC BLOOD PRESSURE: 87 MMHG | HEART RATE: 68 BPM

## 2023-11-02 DIAGNOSIS — K38.9 DISEASE OF APPENDIX, UNSPECIFIED: ICD-10-CM

## 2023-11-02 PROCEDURE — 99204 OFFICE O/P NEW MOD 45 MIN: CPT

## 2023-11-10 ENCOUNTER — TRANSCRIPTION ENCOUNTER (OUTPATIENT)
Age: 73
End: 2023-11-10

## 2023-11-13 ENCOUNTER — RX RENEWAL (OUTPATIENT)
Age: 73
End: 2023-11-13

## 2023-11-13 RX ORDER — METOPROLOL SUCCINATE 50 MG/1
50 TABLET, EXTENDED RELEASE ORAL DAILY
Qty: 90 | Refills: 3 | Status: ACTIVE | COMMUNITY
Start: 2017-04-09 | End: 1900-01-01

## 2023-11-27 PROBLEM — K38.9 DISORDER OF APPENDIX: Status: ACTIVE | Noted: 2023-11-27

## 2023-12-07 ENCOUNTER — OUTPATIENT (OUTPATIENT)
Dept: OUTPATIENT SERVICES | Facility: HOSPITAL | Age: 73
LOS: 1 days | End: 2023-12-07
Payer: MEDICARE

## 2023-12-07 VITALS
HEART RATE: 66 BPM | WEIGHT: 201.94 LBS | HEIGHT: 71 IN | TEMPERATURE: 97 F | DIASTOLIC BLOOD PRESSURE: 80 MMHG | OXYGEN SATURATION: 98 % | RESPIRATION RATE: 20 BRPM | SYSTOLIC BLOOD PRESSURE: 120 MMHG

## 2023-12-07 DIAGNOSIS — I25.10 ATHEROSCLEROTIC HEART DISEASE OF NATIVE CORONARY ARTERY WITHOUT ANGINA PECTORIS: ICD-10-CM

## 2023-12-07 DIAGNOSIS — Z91.89 OTHER SPECIFIED PERSONAL RISK FACTORS, NOT ELSEWHERE CLASSIFIED: ICD-10-CM

## 2023-12-07 DIAGNOSIS — I10 ESSENTIAL (PRIMARY) HYPERTENSION: ICD-10-CM

## 2023-12-07 DIAGNOSIS — Z96.652 PRESENCE OF LEFT ARTIFICIAL KNEE JOINT: Chronic | ICD-10-CM

## 2023-12-07 DIAGNOSIS — K38.9 DISEASE OF APPENDIX, UNSPECIFIED: ICD-10-CM

## 2023-12-07 DIAGNOSIS — I82.409 ACUTE EMBOLISM AND THROMBOSIS OF UNSPECIFIED DEEP VEINS OF UNSPECIFIED LOWER EXTREMITY: ICD-10-CM

## 2023-12-07 DIAGNOSIS — Z98.89 OTHER SPECIFIED POSTPROCEDURAL STATES: Chronic | ICD-10-CM

## 2023-12-07 DIAGNOSIS — E03.9 HYPOTHYROIDISM, UNSPECIFIED: ICD-10-CM

## 2023-12-07 DIAGNOSIS — Z29.9 ENCOUNTER FOR PROPHYLACTIC MEASURES, UNSPECIFIED: ICD-10-CM

## 2023-12-07 DIAGNOSIS — Z01.818 ENCOUNTER FOR OTHER PREPROCEDURAL EXAMINATION: ICD-10-CM

## 2023-12-07 LAB
A1C WITH ESTIMATED AVERAGE GLUCOSE RESULT: 6.2 % — HIGH (ref 4–5.6)
A1C WITH ESTIMATED AVERAGE GLUCOSE RESULT: 6.2 % — HIGH (ref 4–5.6)
ALBUMIN SERPL ELPH-MCNC: 4.7 G/DL — SIGNIFICANT CHANGE UP (ref 3.3–5.2)
ALBUMIN SERPL ELPH-MCNC: 4.7 G/DL — SIGNIFICANT CHANGE UP (ref 3.3–5.2)
ALP SERPL-CCNC: 72 U/L — SIGNIFICANT CHANGE UP (ref 40–120)
ALP SERPL-CCNC: 72 U/L — SIGNIFICANT CHANGE UP (ref 40–120)
ALT FLD-CCNC: 18 U/L — SIGNIFICANT CHANGE UP
ALT FLD-CCNC: 18 U/L — SIGNIFICANT CHANGE UP
ANION GAP SERPL CALC-SCNC: 11 MMOL/L — SIGNIFICANT CHANGE UP (ref 5–17)
ANION GAP SERPL CALC-SCNC: 11 MMOL/L — SIGNIFICANT CHANGE UP (ref 5–17)
APTT BLD: 33.8 SEC — SIGNIFICANT CHANGE UP (ref 24.5–35.6)
APTT BLD: 33.8 SEC — SIGNIFICANT CHANGE UP (ref 24.5–35.6)
AST SERPL-CCNC: 29 U/L — SIGNIFICANT CHANGE UP
AST SERPL-CCNC: 29 U/L — SIGNIFICANT CHANGE UP
BASOPHILS # BLD AUTO: 0.04 K/UL — SIGNIFICANT CHANGE UP (ref 0–0.2)
BASOPHILS # BLD AUTO: 0.04 K/UL — SIGNIFICANT CHANGE UP (ref 0–0.2)
BASOPHILS NFR BLD AUTO: 0.6 % — SIGNIFICANT CHANGE UP (ref 0–2)
BASOPHILS NFR BLD AUTO: 0.6 % — SIGNIFICANT CHANGE UP (ref 0–2)
BILIRUB SERPL-MCNC: 0.5 MG/DL — SIGNIFICANT CHANGE UP (ref 0.4–2)
BILIRUB SERPL-MCNC: 0.5 MG/DL — SIGNIFICANT CHANGE UP (ref 0.4–2)
BLD GP AB SCN SERPL QL: SIGNIFICANT CHANGE UP
BLD GP AB SCN SERPL QL: SIGNIFICANT CHANGE UP
BUN SERPL-MCNC: 15.7 MG/DL — SIGNIFICANT CHANGE UP (ref 8–20)
BUN SERPL-MCNC: 15.7 MG/DL — SIGNIFICANT CHANGE UP (ref 8–20)
CALCIUM SERPL-MCNC: 9.5 MG/DL — SIGNIFICANT CHANGE UP (ref 8.4–10.5)
CALCIUM SERPL-MCNC: 9.5 MG/DL — SIGNIFICANT CHANGE UP (ref 8.4–10.5)
CHLORIDE SERPL-SCNC: 103 MMOL/L — SIGNIFICANT CHANGE UP (ref 96–108)
CHLORIDE SERPL-SCNC: 103 MMOL/L — SIGNIFICANT CHANGE UP (ref 96–108)
CO2 SERPL-SCNC: 25 MMOL/L — SIGNIFICANT CHANGE UP (ref 22–29)
CO2 SERPL-SCNC: 25 MMOL/L — SIGNIFICANT CHANGE UP (ref 22–29)
CREAT SERPL-MCNC: 0.71 MG/DL — SIGNIFICANT CHANGE UP (ref 0.5–1.3)
CREAT SERPL-MCNC: 0.71 MG/DL — SIGNIFICANT CHANGE UP (ref 0.5–1.3)
EGFR: 97 ML/MIN/1.73M2 — SIGNIFICANT CHANGE UP
EGFR: 97 ML/MIN/1.73M2 — SIGNIFICANT CHANGE UP
EOSINOPHIL # BLD AUTO: 0.09 K/UL — SIGNIFICANT CHANGE UP (ref 0–0.5)
EOSINOPHIL # BLD AUTO: 0.09 K/UL — SIGNIFICANT CHANGE UP (ref 0–0.5)
EOSINOPHIL NFR BLD AUTO: 1.3 % — SIGNIFICANT CHANGE UP (ref 0–6)
EOSINOPHIL NFR BLD AUTO: 1.3 % — SIGNIFICANT CHANGE UP (ref 0–6)
ESTIMATED AVERAGE GLUCOSE: 131 MG/DL — HIGH (ref 68–114)
ESTIMATED AVERAGE GLUCOSE: 131 MG/DL — HIGH (ref 68–114)
GLUCOSE SERPL-MCNC: 100 MG/DL — HIGH (ref 70–99)
GLUCOSE SERPL-MCNC: 100 MG/DL — HIGH (ref 70–99)
HCT VFR BLD CALC: 46.4 % — SIGNIFICANT CHANGE UP (ref 39–50)
HCT VFR BLD CALC: 46.4 % — SIGNIFICANT CHANGE UP (ref 39–50)
HGB BLD-MCNC: 15.5 G/DL — SIGNIFICANT CHANGE UP (ref 13–17)
HGB BLD-MCNC: 15.5 G/DL — SIGNIFICANT CHANGE UP (ref 13–17)
IMM GRANULOCYTES NFR BLD AUTO: 0.3 % — SIGNIFICANT CHANGE UP (ref 0–0.9)
IMM GRANULOCYTES NFR BLD AUTO: 0.3 % — SIGNIFICANT CHANGE UP (ref 0–0.9)
INR BLD: 1.1 RATIO — SIGNIFICANT CHANGE UP (ref 0.85–1.18)
INR BLD: 1.1 RATIO — SIGNIFICANT CHANGE UP (ref 0.85–1.18)
LYMPHOCYTES # BLD AUTO: 1.48 K/UL — SIGNIFICANT CHANGE UP (ref 1–3.3)
LYMPHOCYTES # BLD AUTO: 1.48 K/UL — SIGNIFICANT CHANGE UP (ref 1–3.3)
LYMPHOCYTES # BLD AUTO: 22 % — SIGNIFICANT CHANGE UP (ref 13–44)
LYMPHOCYTES # BLD AUTO: 22 % — SIGNIFICANT CHANGE UP (ref 13–44)
MCHC RBC-ENTMCNC: 29 PG — SIGNIFICANT CHANGE UP (ref 27–34)
MCHC RBC-ENTMCNC: 29 PG — SIGNIFICANT CHANGE UP (ref 27–34)
MCHC RBC-ENTMCNC: 33.4 GM/DL — SIGNIFICANT CHANGE UP (ref 32–36)
MCHC RBC-ENTMCNC: 33.4 GM/DL — SIGNIFICANT CHANGE UP (ref 32–36)
MCV RBC AUTO: 86.9 FL — SIGNIFICANT CHANGE UP (ref 80–100)
MCV RBC AUTO: 86.9 FL — SIGNIFICANT CHANGE UP (ref 80–100)
MONOCYTES # BLD AUTO: 0.66 K/UL — SIGNIFICANT CHANGE UP (ref 0–0.9)
MONOCYTES # BLD AUTO: 0.66 K/UL — SIGNIFICANT CHANGE UP (ref 0–0.9)
MONOCYTES NFR BLD AUTO: 9.8 % — SIGNIFICANT CHANGE UP (ref 2–14)
MONOCYTES NFR BLD AUTO: 9.8 % — SIGNIFICANT CHANGE UP (ref 2–14)
NEUTROPHILS # BLD AUTO: 4.44 K/UL — SIGNIFICANT CHANGE UP (ref 1.8–7.4)
NEUTROPHILS # BLD AUTO: 4.44 K/UL — SIGNIFICANT CHANGE UP (ref 1.8–7.4)
NEUTROPHILS NFR BLD AUTO: 66 % — SIGNIFICANT CHANGE UP (ref 43–77)
NEUTROPHILS NFR BLD AUTO: 66 % — SIGNIFICANT CHANGE UP (ref 43–77)
PLATELET # BLD AUTO: 236 K/UL — SIGNIFICANT CHANGE UP (ref 150–400)
PLATELET # BLD AUTO: 236 K/UL — SIGNIFICANT CHANGE UP (ref 150–400)
POTASSIUM SERPL-MCNC: 4.5 MMOL/L — SIGNIFICANT CHANGE UP (ref 3.5–5.3)
POTASSIUM SERPL-MCNC: 4.5 MMOL/L — SIGNIFICANT CHANGE UP (ref 3.5–5.3)
POTASSIUM SERPL-SCNC: 4.5 MMOL/L — SIGNIFICANT CHANGE UP (ref 3.5–5.3)
POTASSIUM SERPL-SCNC: 4.5 MMOL/L — SIGNIFICANT CHANGE UP (ref 3.5–5.3)
PROT SERPL-MCNC: 7.4 G/DL — SIGNIFICANT CHANGE UP (ref 6.6–8.7)
PROT SERPL-MCNC: 7.4 G/DL — SIGNIFICANT CHANGE UP (ref 6.6–8.7)
PROTHROM AB SERPL-ACNC: 12.2 SEC — SIGNIFICANT CHANGE UP (ref 9.5–13)
PROTHROM AB SERPL-ACNC: 12.2 SEC — SIGNIFICANT CHANGE UP (ref 9.5–13)
RBC # BLD: 5.34 M/UL — SIGNIFICANT CHANGE UP (ref 4.2–5.8)
RBC # BLD: 5.34 M/UL — SIGNIFICANT CHANGE UP (ref 4.2–5.8)
RBC # FLD: 14 % — SIGNIFICANT CHANGE UP (ref 10.3–14.5)
RBC # FLD: 14 % — SIGNIFICANT CHANGE UP (ref 10.3–14.5)
SODIUM SERPL-SCNC: 139 MMOL/L — SIGNIFICANT CHANGE UP (ref 135–145)
SODIUM SERPL-SCNC: 139 MMOL/L — SIGNIFICANT CHANGE UP (ref 135–145)
WBC # BLD: 6.73 K/UL — SIGNIFICANT CHANGE UP (ref 3.8–10.5)
WBC # BLD: 6.73 K/UL — SIGNIFICANT CHANGE UP (ref 3.8–10.5)
WBC # FLD AUTO: 6.73 K/UL — SIGNIFICANT CHANGE UP (ref 3.8–10.5)
WBC # FLD AUTO: 6.73 K/UL — SIGNIFICANT CHANGE UP (ref 3.8–10.5)

## 2023-12-07 PROCEDURE — G0463: CPT

## 2023-12-07 NOTE — H&P PST ADULT - NSICDXPASTSURGICALHX_GEN_ALL_CORE_FT
PAST SURGICAL HISTORY:  History of hip surgery hip replacement x2    S/P herniorrhaphy      PAST SURGICAL HISTORY:  H/O total knee replacement, left     History of hip surgery hip replacement x2    S/P herniorrhaphy

## 2023-12-07 NOTE — H&P PST ADULT - NSICDXPASTMEDICALHX_GEN_ALL_CORE_FT
PAST MEDICAL HISTORY:  HTN (hypertension)     Hypothyroid     Pulmonary embolism      PAST MEDICAL HISTORY:  CAD (coronary artery disease)     Deep vein thrombosis (DVT)     Disease of appendix     HTN (hypertension)     Hypothyroid     Pulmonary embolism

## 2023-12-07 NOTE — H&P PST ADULT - HISTORY OF PRESENT ILLNESS
Narrative: This a 69 year old male PMH: DVT LLE post long car ride 1 1/2 year ago  on Eliquis)( Last dose on )   Valvular disease Ischemic heart disease s/p multivessel CABG  with Dr Giron .He presented to his cardiologist with reports of shortness of breath and chest heaviness. A Nuclear stress test was done20   with abnormal myocardial perfusion images with a small basal inferior wall infarct with maria luz- infarct ischemia, with moderate hypokinesis of the basal inferior walls with a preserved EF 66 % . He reports dyspnea at rest with increasing frequency . Denies chest pain   He was referred for a cardiac cath with Dr Tyler on 20     RYAN TURCIOS is being seen for arrhythmia/ECG abnormalities, structural heart and valve disease, hyperlipidemia, coronary artery disease and carotid, aortic and peripheral vascular disease.     The patient is a 72-year-old white male who has a history for CAD, remote CABG x2 vessels and prior PCI/stent (most recent 2020-circumflex. One occluded graft found); Also heart murmur of moderate aortic stenosis as also found on last echocardiogram;    He returns to the office today for general cardiac checkup, and now in need of cardiac clearance regarding upcoming GI procedures (colonoscopy and endoscopy), with Dr. Daniel Alejandre;  It appears that this exam was recommended because of patient noticing some weight loss over the past year or so of questionable etiology (13 to 15 pounds estimated);    He has had a stable cardiac pattern without chest pain, shortness of breath, palpitations, dizziness or syncope;      Mr. RYAN TURCIOS is a 72 year old male who presents with an enlarged appendix up to 9 mm with distal tapers. Referred by Dr. Alejandre with [facility].     PMHx: aortic stenosis; CAD; DVT; Hyperthyroidism; obesity; BPH; blood clots; GERD; HTN  FMHx: Unspecified - heart disease  SHx: CABG; Hernia repair; Left hip replacement  SocHx: former smoker    CT Chest/Abd 10/16/2023:  IMPRESSION:  - Appendix is prominent proximally, measuring up to 9 mm and tapers distally  - No adjacent inflammatory changes  - No evidence to suggest acute appendicitis     - 2023: Patient presents today for initial consultation regarding enlarged appendix. CT was obtained to investigate the patient's unintentional weight loss. Complained of 15-20 unintentional weight loss, over the course of past few years. Denies F/C/N/V/D or abdominal pain. Of note, the patient feels he has an extensive surgical history and expresses hesitation with surgical excision.     Prior Cardiac Interventions (LHC, stents, CABG):  Cardiac Cath Lab - Adult (16 @ 09:16) >  CORONARY VESSELS: The coronary circulation is right dominant.  Proximal left main: There was a 20 % stenosis.   Mid LAD: There was a tubular 95 % stenosis.  --  Distal LAD: There was a 70 % stenosis.  --  D1: There was a 70 % stenosis.  Ostial circumflex: There was a 20 % stenosis.   Proximal circumflex: There was a 40 % stenosis. FFR 1.0  Mid RCA: There was a 100 % stenosis.   RPLS: Fills via collateral from Circumflex.      Noninvasive Testin20        Protocol: Pharmacological        Duration of Exercise: n/a        Symptoms: none        EKG Changes: none        Myocardial Imaging: myocardial perfusion images with a small basal inferior wall infarct with maria luz- infarct ischemia, with moderate hypokinesis of the basal inferior walls with a preserved EF 66 %        Risk Assessment (Low, Medium, High):     Echo (Date, Findings):     Antianginal Therapies:  Beta Blockers:  Toprol 50 mg po daily          Associated Risk Factors:        Cerebrovascular Disease: N/A       Chronic Lung Disease: N/A       Peripheral Arterial Disease: N/A       Chronic Kidney Disease (if yes, what is GFR): N/A       Uncontrolled Diabetes (if yes, what is HgbA1C or FBS): N/A       Poorly Controlled Hypertension (if yes, what is SBP): N/A       Morbid Obesity (if yes, what is BMI): N/A       History of Recent Ventricular Arrhythmia: N/A       Inability to Ambulate Safely: N/A       Need for Therapeutic Anticoagulation: N/A       Antiplatelet or Contrast Allergy: N/A Narrative: This a 69 year old male PMH: DVT LLE post long car ride 1 1/2 year ago  on Eliquis)( Last dose on )   Valvular disease Ischemic heart disease s/p multivessel CABG  with Dr Giron .He presented to his cardiologist with reports of shortness of breath and chest heaviness. A Nuclear stress test was done20   with abnormal myocardial perfusion images with a small basal inferior wall infarct with maria luz- infarct ischemia, with moderate hypokinesis of the basal inferior walls with a preserved EF 66 % . He reports dyspnea at rest with increasing frequency . Denies chest pain   He was referred for a cardiac cath with Dr Tyler on 20     RYAN TURCIOS is being seen for arrhythmia/ECG abnormalities, structural heart and valve disease, hyperlipidemia, coronary artery disease and carotid, aortic and peripheral vascular disease.     The patient is a 72-year-old white male who has a history for CAD, remote CABG x2 vessels and prior PCI/stent (most recent 2020-circumflex. One occluded graft found); Also heart murmur of moderate aortic stenosis as also found on last echocardiogram;    He returns to the office today for general cardiac checkup, and now in need of cardiac clearance regarding upcoming GI procedures (colonoscopy and endoscopy), with Dr. Daniel Alejandre;  It appears that this exam was recommended because of patient noticing some weight loss over the past year or so of questionable etiology (13 to 15 pounds estimated);    He has had a stable cardiac pattern without chest pain, shortness of breath, palpitations, dizziness or syncope;      Mr. RYAN TURCIOS is a 72 year old male who presents with an enlarged appendix up to 9 mm with distal tapers. Referred by Dr. Alejandre with [facility].     PMHx: aortic stenosis; CAD; DVT; Hyperthyroidism; obesity; BPH; blood clots; GERD; HTN  FMHx: Unspecified - heart disease  SHx: CABG; Hernia repair; Left hip replacement  SocHx: former smoker    CT Chest/Abd 10/16/2023:  IMPRESSION:  - Appendix is prominent proximally, measuring up to 9 mm and tapers distally  - No adjacent inflammatory changes  - No evidence to suggest acute appendicitis     - 2023: Patient presents today for initial consultation regarding enlarged appendix. CT was obtained to investigate the patient's unintentional weight loss. Complained of 15-20 unintentional weight loss, over the course of past few years. Denies F/C/N/V/D or abdominal pain. Of note, the patient feels he has an extensive surgical history and expresses hesitation with surgical excision.     Prior Cardiac Interventions (LHC, stents, CABG):  Cardiac Cath Lab - Adult (16 @ 09:16) >  CORONARY VESSELS: The coronary circulation is right dominant.  Proximal left main: There was a 20 % stenosis.   Mid LAD: There was a tubular 95 % stenosis.  --  Distal LAD: There was a 70 % stenosis.  --  D1: There was a 70 % stenosis.  Ostial circumflex: There was a 20 % stenosis.   Proximal circumflex: There was a 40 % stenosis. FFR 1.0  Mid RCA: There was a 100 % stenosis.   RPLS: Fills via collateral from Circumflex.      Noninvasive Testin20        Protocol: Pharmacological        Duration of Exercise: n/a        Symptoms: none        EKG Changes: none        Myocardial Imaging: myocardial perfusion images with a small basal inferior wall infarct with maria luz- infarct ischemia, with moderate hypokinesis of the basal inferior walls with a preserved EF 66 %        Risk Assessment (Low, Medium, High):     Echo (Date, Findings):     Antianginal Therapies:  Beta Blockers:  Toprol 50 mg po daily          Associated Risk Factors:        Cerebrovascular Disease: N/A       Chronic Lung Disease: N/A       Peripheral Arterial Disease: N/A       Chronic Kidney Disease (if yes, what is GFR): N/A       Uncontrolled Diabetes (if yes, what is HgbA1C or FBS): N/A       Poorly Controlled Hypertension (if yes, what is SBP): N/A       Morbid Obesity (if yes, what is BMI): N/A       History of Recent Ventricular Arrhythmia: N/A       Inability to Ambulate Safely: N/A       Need for Therapeutic Anticoagulation: N/A       Antiplatelet or Contrast Allergy: N/A Narrative: This a 69 year old male PMH: PE  following hip replacement, followed by DVT LLE post long car ride 1 1/2 year ago on Eliquis  CABG x4   aortic stenosis; CAD; DVT; Hyperthyroidism; obesity; BPH; blood clots; GERD; HTN    ( Last dose on )   Valvular disease Ischemic heart disease s/p multivessel CABG  with Dr Giron .He presented to his cardiologist with reports of shortness of breath and chest heaviness. A Nuclear stress test was done20   with abnormal myocardial perfusion images with a small basal inferior wall infarct with maria luz- infarct ischemia, with moderate hypokinesis of the basal inferior walls with a preserved EF 66 % . He reports dyspnea at rest with increasing frequency . Denies chest pain   He was referred for a cardiac cath with Dr Tyler on 20     RYAN TURCIOS is being seen for arrhythmia/ECG abnormalities, structural heart and valve disease, hyperlipidemia, coronary artery disease and carotid, aortic and peripheral vascular disease.     The patient is a 72-year-old white male who has a history for CAD, remote CABG x2 vessels and prior PCI/stent (most recent 2020-circumflex. One occluded graft found); Also heart murmur of moderate aortic stenosis as also found on last echocardiogram;    He returns to the office today for general cardiac checkup, and now in need of cardiac clearance regarding upcoming GI procedures (colonoscopy and endoscopy), with Dr. Daniel Alejandre;  It appears that this exam was recommended because of patient noticing some weight loss over the past year or so of questionable etiology (13 to 15 pounds estimated);    He has had a stable cardiac pattern without chest pain, shortness of breath, palpitations, dizziness or syncope;      Mr. RYAN TURCIOS is a 72 year old male who presents with an enlarged appendix up to 9 mm with distal tapers. Referred by Dr. Alejandre with [facility].     PMHx: aortic stenosis; CAD; DVT; Hyperthyroidism; obesity; BPH; blood clots; GERD; HTN  FMHx: Unspecified - heart disease  SHx: CABG; Hernia repair; Left hip replacement  SocHx: former smoker    CT Chest/Abd 10/16/2023:  IMPRESSION:  - Appendix is prominent proximally, measuring up to 9 mm and tapers distally  - No adjacent inflammatory changes  - No evidence to suggest acute appendicitis     - 2023: Patient presents today for initial consultation regarding enlarged appendix. CT was obtained to investigate the patient's unintentional weight loss. Complained of 15-20 unintentional weight loss, over the course of past few years. Denies F/C/N/V/D or abdominal pain. Of note, the patient feels he has an extensive surgical history and expresses hesitation with surgical excision.     Prior Cardiac Interventions (LHC, stents, CABG):  Cardiac Cath Lab - Adult (16 @ 09:16) >  CORONARY VESSELS: The coronary circulation is right dominant.  Proximal left main: There was a 20 % stenosis.   Mid LAD: There was a tubular 95 % stenosis.  --  Distal LAD: There was a 70 % stenosis.  --  D1: There was a 70 % stenosis.  Ostial circumflex: There was a 20 % stenosis.   Proximal circumflex: There was a 40 % stenosis. FFR 1.0  Mid RCA: There was a 100 % stenosis.   RPLS: Fills via collateral from Circumflex.      Noninvasive Testin20        Protocol: Pharmacological        Duration of Exercise: n/a        Symptoms: none        EKG Changes: none        Myocardial Imaging: myocardial perfusion images with a small basal inferior wall infarct with maria luz- infarct ischemia, with moderate hypokinesis of the basal inferior walls with a preserved EF 66 %        Risk Assessment (Low, Medium, High):     Echo (Date, Findings):     Antianginal Therapies:  Beta Blockers:  Toprol 50 mg po daily          Associated Risk Factors:        Cerebrovascular Disease: N/A       Chronic Lung Disease: N/A       Peripheral Arterial Disease: N/A       Chronic Kidney Disease (if yes, what is GFR): N/A       Uncontrolled Diabetes (if yes, what is HgbA1C or FBS): N/A       Poorly Controlled Hypertension (if yes, what is SBP): N/A       Morbid Obesity (if yes, what is BMI): N/A       History of Recent Ventricular Arrhythmia: N/A       Inability to Ambulate Safely: N/A       Need for Therapeutic Anticoagulation: N/A       Antiplatelet or Contrast Allergy: N/A Narrative: This a 69 year old male PMH: PE  following hip replacement, followed by DVT LLE post long car ride 1 1/2 year ago on Eliquis  CABG x4   aortic stenosis; CAD; DVT; Hyperthyroidism; obesity; BPH; blood clots; GERD; HTN    ( Last dose on )   Valvular disease Ischemic heart disease s/p multivessel CABG  with Dr Giron .He presented to his cardiologist with reports of shortness of breath and chest heaviness. A Nuclear stress test was done20   with abnormal myocardial perfusion images with a small basal inferior wall infarct with maria luz- infarct ischemia, with moderate hypokinesis of the basal inferior walls with a preserved EF 66 % . He reports dyspnea at rest with increasing frequency . Denies chest pain   He was referred for a cardiac cath with Dr Tyler on 20     RYAN TURCIOS is being seen for arrhythmia/ECG abnormalities, structural heart and valve disease, hyperlipidemia, coronary artery disease and carotid, aortic and peripheral vascular disease.     The patient is a 72-year-old white male who has a history for CAD, remote CABG x2 vessels and prior PCI/stent (most recent 2020-circumflex. One occluded graft found); Also heart murmur of moderate aortic stenosis as also found on last echocardiogram;    He returns to the office today for general cardiac checkup, and now in need of cardiac clearance regarding upcoming GI procedures (colonoscopy and endoscopy), with Dr. Daniel Alejandre;  It appears that this exam was recommended because of patient noticing some weight loss over the past year or so of questionable etiology (13 to 15 pounds estimated);    He has had a stable cardiac pattern without chest pain, shortness of breath, palpitations, dizziness or syncope;      Mr. RYAN TURCIOS is a 72 year old male who presents with an enlarged appendix up to 9 mm with distal tapers. Referred by Dr. Alejandre with [facility].     PMHx: aortic stenosis; CAD; DVT; Hyperthyroidism; obesity; BPH; blood clots; GERD; HTN  FMHx: Unspecified - heart disease  SHx: CABG; Hernia repair; Left hip replacement  SocHx: former smoker    CT Chest/Abd 10/16/2023:  IMPRESSION:  - Appendix is prominent proximally, measuring up to 9 mm and tapers distally  - No adjacent inflammatory changes  - No evidence to suggest acute appendicitis     - 2023: Patient presents today for initial consultation regarding enlarged appendix. CT was obtained to investigate the patient's unintentional weight loss. Complained of 15-20 unintentional weight loss, over the course of past few years. Denies F/C/N/V/D or abdominal pain. Of note, the patient feels he has an extensive surgical history and expresses hesitation with surgical excision.     Prior Cardiac Interventions (LHC, stents, CABG):  Cardiac Cath Lab - Adult (16 @ 09:16) >  CORONARY VESSELS: The coronary circulation is right dominant.  Proximal left main: There was a 20 % stenosis.   Mid LAD: There was a tubular 95 % stenosis.  --  Distal LAD: There was a 70 % stenosis.  --  D1: There was a 70 % stenosis.  Ostial circumflex: There was a 20 % stenosis.   Proximal circumflex: There was a 40 % stenosis. FFR 1.0  Mid RCA: There was a 100 % stenosis.   RPLS: Fills via collateral from Circumflex.      Noninvasive Testin20        Protocol: Pharmacological        Duration of Exercise: n/a        Symptoms: none        EKG Changes: none        Myocardial Imaging: myocardial perfusion images with a small basal inferior wall infarct with maria luz- infarct ischemia, with moderate hypokinesis of the basal inferior walls with a preserved EF 66 %        Risk Assessment (Low, Medium, High):     Echo (Date, Findings):     Antianginal Therapies:  Beta Blockers:  Toprol 50 mg po daily          Associated Risk Factors:        Cerebrovascular Disease: N/A       Chronic Lung Disease: N/A       Peripheral Arterial Disease: N/A       Chronic Kidney Disease (if yes, what is GFR): N/A       Uncontrolled Diabetes (if yes, what is HgbA1C or FBS): N/A       Poorly Controlled Hypertension (if yes, what is SBP): N/A       Morbid Obesity (if yes, what is BMI): N/A       History of Recent Ventricular Arrhythmia: N/A       Inability to Ambulate Safely: N/A       Need for Therapeutic Anticoagulation: N/A       Antiplatelet or Contrast Allergy: N/A 73 year old male PMHx of PE 1997 following hip replacement, followed by DVT LLE post long car ride 1 1/2 year ago on Eliquis, has IVC filter, CAD, HLD, PVD,moderate AS, CABG x2 2016, followed by stent placement Sept 2020 circumflex, BPH, GERD, HTN. Patient presents for unintentional weight loss of approximately 60 lbs over the last few years, he denies any changes to diet, states he is now exercising 3x a week approximately 30 minutes on his bike, however his weight loss had started before establishing exercise regimen. Patient is s/p colonoscopy and endoscopy that were negative as per patient, CT chest/abdomen 10/16/23 revealed an enlarged appendix up to 9 mm with distal tapers. Patient denies n/v/d/c, abdominal pain, melena, brbpr, fever, or chills. Patient is scheduled for robotic assisted laparoscopic appendectomy, possible colectomy, possible open on 12/11/23 with Dr Carcamo.  Medical and cardiac evaluation pending

## 2023-12-07 NOTE — H&P PST ADULT - ENMT
Report taken from OLIMPIA Sheldon. Pt breathing even and unlabored in bed at baseline mental status. Pt denies chest pain, SOB, dizziness, headache, blurry vision, chills. Bed in lowest position, call bell within reach. negative mouth/pharynx/neck

## 2023-12-07 NOTE — H&P PST ADULT - NSICDXFAMILYHX_GEN_ALL_CORE_FT
FAMILY HISTORY:  No pertinent family history FAMILY HISTORY:  Father  Still living? Unknown  Family history of pacemaker, Age at diagnosis: Age Unknown

## 2023-12-07 NOTE — H&P PST ADULT - ATTENDING COMMENTS
Risks, benefits, and alternatives discussed with the patient - he expressed understanding and agrees to proceed with robotic assisted laparoscopic appendectomy, possible colectomy, possible open.

## 2023-12-07 NOTE — H&P PST ADULT - PROBLEM SELECTOR PLAN 1
Patient is scheduled for robotic assisted laparoscopic appendectomy, possible colectomy, possible open on 12/11/23 with Dr Carcamo.   Medical and cardiac evaluation pending

## 2023-12-07 NOTE — H&P PST ADULT - MUSCULOSKELETAL
details… ROM intact/no joint swelling/no joint erythema/no calf tenderness/normal gait/strength 5/5 bilateral upper extremities/strength 5/5 bilateral lower extremities

## 2023-12-07 NOTE — H&P PST ADULT - PROBLEM SELECTOR PLAN 3
Addended by: KRISS SAUCEDO on: 1/2/2019 04:27 PM     Modules accepted: Orders, SmartSet     CAD hx of CABG x2, PCI, on plavix, hold 5 days as per cardiologist   cardiac evaluation pending

## 2023-12-07 NOTE — H&P PST ADULT - ASSESSMENT
: This a 69 year old male PMH: DVT LLE post long car ride 1 1/2 year ago  on Eliquis)( Last dose on 9/11)   Valvular disease Ischemic heart disease s/p multivessel CABG 2016 with Dr Giron .He presented to his cardiologist with reports of shortness of breath and chest heaviness. A Nuclear stress test was done7/24/20   with abnormal myocardial perfusion images with a small basal inferior wall infarct with maria luz- infarct ischemia, with moderate hypokinesis of the basal inferior walls with a preserved EF 66 % . He reports dyspnea at rest with increasing frequency . Denies chest pain     `PRE-PROCEDURE ASSESSMENT  [facility] -Patient seen and examined  -Labs and EKG reviewed   -Pre-procedure teaching completed with patient and family  - Informed consent obtained   -Questions answered  - Pt received [id number]  prior to cardiac cath   Risks & benefits of procedure and sedation and risks and benefits for the alternative therapy have been explained to the patient in layman’s terms including but not limited to: allergic reaction, bleeding, infection, arrhythmia, respiratory compromise, renal and vascular compromise, limb damage, MI, CVA, emergent CABG/Vascular Surgery and death. Informed consent obtained and in chart.   : This a 69 year old male PMH: DVT LLE post long car ride 1 1/2 year ago  on Eliquis)( Last dose on 9/11)   Valvular disease Ischemic heart disease s/p multivessel CABG 2016 with Dr Giron .He presented to his cardiologist with reports of shortness of breath and chest heaviness. A Nuclear stress test was done7/24/20   with abnormal myocardial perfusion images with a small basal inferior wall infarct with maria luz- infarct ischemia, with moderate hypokinesis of the basal inferior walls with a preserved EF 66 % . He reports dyspnea at rest with increasing frequency . Denies chest pain     `PRE-PROCEDURE ASSESSMENT  [facility] -Patient seen and examined  -Labs and EKG reviewed   -Pre-procedure teaching completed with patient and family  - Informed consent obtained   -Questions answered  - Pt received [id number]  prior to cardiac cath   Risks & benefits of procedure and sedation and risks and benefits for the alternative therapy have been explained to the patient in layman’s terms including but not limited to: allergic reaction, bleeding, infection, arrhythmia, respiratory compromise, renal and vascular compromise, limb damage, MI, CVA, emergent CABG/Vascular Surgery and death. Informed consent obtained and in chart.   73 year old male PMHx of PE  following hip replacement, followed by DVT LLE post long car ride 1 1/2 year ago on Eliquis, has IVC filter, CAD, HLD, PVD,moderate AS, CABG x2 2016, followed by stent placement 2020 circumflex, BPH, GERD, HTN. Patient presents for unintentional weight loss of approximately 60 lbs over the last few years, he denies any changes to diet, states he is now exercising 3x a week approximately 30 minutes on his bike, however his weight loss had started before establishing exercise regimen. Patient is s/p colonoscopy and endoscopy that were negative as per patient, CT chest/abdomen 10/16/23 revealed an enlarged appendix up to 9 mm with distal tapers. Patient denies n/v/d/c, abdominal pain, melena, brbpr, fever, or chills. Patient is scheduled for robotic assisted laparoscopic appendectomy, possible colectomy, possible open on 23 with Dr Carcamo. Patient educated on surgical scrub, NPO after MN, ERP, preadmission instructions, medical clearance and day of procedure medications, verbalizes understanding. Pt instructed to stop vitamins/supplements/herbal medications/ASA/NSAIDS for one week prior to surgery and discuss with PMD.     CAPRINI SCORE    AGE RELATED RISK FACTORS                                                             [ ] Age 41-60 years                                            (1 Point)  [x ] Age: 61-74 years                                           (2 Points)                 [ ] Age= 75 years                                                (3 Points)             DISEASE RELATED RISK FACTORS                                                       [ ] Edema in the lower extremities                 (1 Point)                     [ ] Varicose veins                                               (1 Point)                                 [x ] BMI > 25 Kg/m2                                            (1 Point)                                  [ ] Serious infection (ie PNA)                            (1 Point)                     [ ] Lung disease ( COPD, Emphysema)            (1 Point)                                                                          [ ] Acute myocardial infarction                         (1 Point)                  [ ] Congestive heart failure (in the previous month)  (1 Point)         [ ] Inflammatory bowel disease                            (1 Point)                  [ ] Central venous access, PICC or Port               (2 points)       (within the last month)                                                                [ ] Stroke (in the previous month)                        (5 Points)    [ ] Previous or present malignancy                       (2 points)                                                                                                                                                         HEMATOLOGY RELATED FACTORS                                                         [ x] Prior episodes of VTE                                     (3 Points)                     [ ] Positive family history for VTE                      (3 Points)                  [ ] Prothrombin 31567 A                                     (3 Points)                     [ ] Factor V Leiden                                                (3 Points)                        [ ] Lupus anticoagulants                                      (3 Points)                                                           [ ] Anticardiolipin antibodies                              (3 Points)                                                       [ ] High homocysteine in the blood                   (3 Points)                                             [ ] Other congenital or acquired thrombophilia      (3 Points)                                                [ ] Heparin induced thrombocytopenia                  (3 Points)                                        MOBILITY RELATED FACTORS  [ ] Bed rest                                                         (1 Point)  [ ] Plaster cast                                                    (2 points)  [ ] Bed bound for more than 72 hours           (2 Points)    GENDER SPECIFIC FACTORS  [ ] Pregnancy or had a baby within the last month   (1 Point)  [ ] Post-partum < 6 weeks                                   (1 Point)  [ ] Hormonal therapy  or oral contraception   (1 Point)  [ ] History of pregnancy complications              (1 point)  [ ] Unexplained or recurrent              (1 Point)    OTHER RISK FACTORS                                           (1 Point)  [ ] BMI >40, smoking, diabetes requiring insulin, chemotherapy  blood transfusions and length of surgery over 2 hours    SURGERY RELATED RISK FACTORS  [ ]  Section within the last month     (1 Point)  [ ] Minor surgery                                                  (1 Point)  [ ] Arthroscopic surgery                                       (2 Points)  [ x] Planned major surgery lasting more            (2 Points)      than 45 minutes     [ ] Elective hip or knee joint replacement       (5 points)       surgery                                                TRAUMA RELATED RISK FACTORS  [ ] Fracture of the hip, pelvis, or leg                       (5 Points)  [ ] Spinal cord injury resulting in paralysis             (5 points)       (in the previous month)    [ ] Paralysis  (less than 1 month)                             (5 Points)  [ ] Multiple Trauma within 1 month                        (5 Points)    Total Score [    8    ]    Caprini Score 0-2: Low Risk, NO VTE prophylaxis required for most patients, encourage ambulation  Caprini Score 3-6: Moderate Risk , pharmacologic VTE prophylaxis is indicated for most patients (in the absence of contraindications)  Caprini Score Greater than or =7: High risk, pharmocologic VTE prophylaxis indicated for most patients (in the absence of contraindications)      OPIOID RISK TOOL    SANTY EACH BOX THAT APPLIES AND ADD TOTALS AT THE END    FAMILY HISTORY OF SUBSTANCE ABUSE                 FEMALE         MALE                                                Alcohol                             [  ]1 pt          [  ]3pts                                               Illegal Durgs                     [  ]2 pts        [  ]3pts                                               Rx Drugs                           [  ]4 pts        [  ]4 pts    PERSONAL HISTORY OF SUBSTANCE ABUSE                                                                                          Alcohol                             [  ]3 pts       [  ]3 pts                                               Illegal Durgs                     [  ]4 pts        [  ]4 pts                                               Rx Drugs                           [  ]5 pts        [  ]5 pts    AGE BETWEEN 16-45 YEARS                                      [  ]1 pt         [  ]1 pt    HISTORY OF PREADOLESCENT   SEXUAL ABUSE                                                             [  ]3 pts        [  ]0pts    PSYCHOLOGICAL DISEASE                     ADD, OCD, Bipolar, Schizophrenia        [  ]2 pts         [  ]2 pts                      Depression                                               [  ]1 pt           [  ]1 pt           SCORING TOTAL  0                                    A score of 3 or lower indicated LOW risk for future opiod abuse  A score of 4 to 7 indicated moderate risk for future opiod abuse  A score of 8 or higher indicates a high risk for opiod abuse           73 year old male PMHx of PE  following hip replacement, followed by DVT LLE post long car ride 1 1/2 year ago on Eliquis, has IVC filter, CAD, HLD, PVD,moderate AS, CABG x2 2016, followed by stent placement 2020 circumflex, BPH, GERD, HTN. Patient presents for unintentional weight loss of approximately 60 lbs over the last few years, he denies any changes to diet, states he is now exercising 3x a week approximately 30 minutes on his bike, however his weight loss had started before establishing exercise regimen. Patient is s/p colonoscopy and endoscopy that were negative as per patient, CT chest/abdomen 10/16/23 revealed an enlarged appendix up to 9 mm with distal tapers. Patient denies n/v/d/c, abdominal pain, melena, brbpr, fever, or chills. Patient is scheduled for robotic assisted laparoscopic appendectomy, possible colectomy, possible open on 23 with Dr Carcamo. Patient educated on surgical scrub, NPO after MN, ERP, preadmission instructions, medical clearance and day of procedure medications, verbalizes understanding. Pt instructed to stop vitamins/supplements/herbal medications/ASA/NSAIDS for one week prior to surgery and discuss with PMD.     CAPRINI SCORE    AGE RELATED RISK FACTORS                                                             [ ] Age 41-60 years                                            (1 Point)  [x ] Age: 61-74 years                                           (2 Points)                 [ ] Age= 75 years                                                (3 Points)             DISEASE RELATED RISK FACTORS                                                       [ ] Edema in the lower extremities                 (1 Point)                     [ ] Varicose veins                                               (1 Point)                                 [x ] BMI > 25 Kg/m2                                            (1 Point)                                  [ ] Serious infection (ie PNA)                            (1 Point)                     [ ] Lung disease ( COPD, Emphysema)            (1 Point)                                                                          [ ] Acute myocardial infarction                         (1 Point)                  [ ] Congestive heart failure (in the previous month)  (1 Point)         [ ] Inflammatory bowel disease                            (1 Point)                  [ ] Central venous access, PICC or Port               (2 points)       (within the last month)                                                                [ ] Stroke (in the previous month)                        (5 Points)    [ ] Previous or present malignancy                       (2 points)                                                                                                                                                         HEMATOLOGY RELATED FACTORS                                                         [ x] Prior episodes of VTE                                     (3 Points)                     [ ] Positive family history for VTE                      (3 Points)                  [ ] Prothrombin 51795 A                                     (3 Points)                     [ ] Factor V Leiden                                                (3 Points)                        [ ] Lupus anticoagulants                                      (3 Points)                                                           [ ] Anticardiolipin antibodies                              (3 Points)                                                       [ ] High homocysteine in the blood                   (3 Points)                                             [ ] Other congenital or acquired thrombophilia      (3 Points)                                                [ ] Heparin induced thrombocytopenia                  (3 Points)                                        MOBILITY RELATED FACTORS  [ ] Bed rest                                                         (1 Point)  [ ] Plaster cast                                                    (2 points)  [ ] Bed bound for more than 72 hours           (2 Points)    GENDER SPECIFIC FACTORS  [ ] Pregnancy or had a baby within the last month   (1 Point)  [ ] Post-partum < 6 weeks                                   (1 Point)  [ ] Hormonal therapy  or oral contraception   (1 Point)  [ ] History of pregnancy complications              (1 point)  [ ] Unexplained or recurrent              (1 Point)    OTHER RISK FACTORS                                           (1 Point)  [ ] BMI >40, smoking, diabetes requiring insulin, chemotherapy  blood transfusions and length of surgery over 2 hours    SURGERY RELATED RISK FACTORS  [ ]  Section within the last month     (1 Point)  [ ] Minor surgery                                                  (1 Point)  [ ] Arthroscopic surgery                                       (2 Points)  [ x] Planned major surgery lasting more            (2 Points)      than 45 minutes     [ ] Elective hip or knee joint replacement       (5 points)       surgery                                                TRAUMA RELATED RISK FACTORS  [ ] Fracture of the hip, pelvis, or leg                       (5 Points)  [ ] Spinal cord injury resulting in paralysis             (5 points)       (in the previous month)    [ ] Paralysis  (less than 1 month)                             (5 Points)  [ ] Multiple Trauma within 1 month                        (5 Points)    Total Score [    8    ]    Caprini Score 0-2: Low Risk, NO VTE prophylaxis required for most patients, encourage ambulation  Caprini Score 3-6: Moderate Risk , pharmacologic VTE prophylaxis is indicated for most patients (in the absence of contraindications)  Caprini Score Greater than or =7: High risk, pharmocologic VTE prophylaxis indicated for most patients (in the absence of contraindications)      OPIOID RISK TOOL    SANTY EACH BOX THAT APPLIES AND ADD TOTALS AT THE END    FAMILY HISTORY OF SUBSTANCE ABUSE                 FEMALE         MALE                                                Alcohol                             [  ]1 pt          [  ]3pts                                               Illegal Durgs                     [  ]2 pts        [  ]3pts                                               Rx Drugs                           [  ]4 pts        [  ]4 pts    PERSONAL HISTORY OF SUBSTANCE ABUSE                                                                                          Alcohol                             [  ]3 pts       [  ]3 pts                                               Illegal Durgs                     [  ]4 pts        [  ]4 pts                                               Rx Drugs                           [  ]5 pts        [  ]5 pts    AGE BETWEEN 16-45 YEARS                                      [  ]1 pt         [  ]1 pt    HISTORY OF PREADOLESCENT   SEXUAL ABUSE                                                             [  ]3 pts        [  ]0pts    PSYCHOLOGICAL DISEASE                     ADD, OCD, Bipolar, Schizophrenia        [  ]2 pts         [  ]2 pts                      Depression                                               [  ]1 pt           [  ]1 pt           SCORING TOTAL  0                                    A score of 3 or lower indicated LOW risk for future opiod abuse  A score of 4 to 7 indicated moderate risk for future opiod abuse  A score of 8 or higher indicates a high risk for opiod abuse

## 2023-12-11 PROBLEM — I25.10 ATHEROSCLEROTIC HEART DISEASE OF NATIVE CORONARY ARTERY WITHOUT ANGINA PECTORIS: Chronic | Status: ACTIVE | Noted: 2023-12-07

## 2023-12-12 PROBLEM — K38.9 DISEASE OF APPENDIX, UNSPECIFIED: Chronic | Status: ACTIVE | Noted: 2023-12-07

## 2023-12-12 PROBLEM — I82.409 ACUTE EMBOLISM AND THROMBOSIS OF UNSPECIFIED DEEP VEINS OF UNSPECIFIED LOWER EXTREMITY: Chronic | Status: ACTIVE | Noted: 2023-12-07

## 2023-12-18 RX ORDER — CEFAZOLIN SODIUM 1 G
2000 VIAL (EA) INJECTION ONCE
Refills: 0 | Status: DISCONTINUED | OUTPATIENT
Start: 2023-12-20 | End: 2023-12-20

## 2023-12-18 RX ORDER — SODIUM CHLORIDE 9 MG/ML
3 INJECTION INTRAMUSCULAR; INTRAVENOUS; SUBCUTANEOUS EVERY 8 HOURS
Refills: 0 | Status: DISCONTINUED | OUTPATIENT
Start: 2023-12-20 | End: 2023-12-20

## 2023-12-18 RX ORDER — HEPARIN SODIUM 5000 [USP'U]/ML
5000 INJECTION INTRAVENOUS; SUBCUTANEOUS ONCE
Refills: 0 | Status: COMPLETED | OUTPATIENT
Start: 2023-12-20 | End: 2023-12-20

## 2023-12-18 RX ORDER — BUPIVACAINE 13.3 MG/ML
20 INJECTION, SUSPENSION, LIPOSOMAL INFILTRATION ONCE
Refills: 0 | Status: DISCONTINUED | OUTPATIENT
Start: 2023-12-20 | End: 2023-12-20

## 2023-12-19 ENCOUNTER — TRANSCRIPTION ENCOUNTER (OUTPATIENT)
Age: 73
End: 2023-12-19

## 2023-12-20 ENCOUNTER — TRANSCRIPTION ENCOUNTER (OUTPATIENT)
Age: 73
End: 2023-12-20

## 2023-12-20 ENCOUNTER — RESULT REVIEW (OUTPATIENT)
Age: 73
End: 2023-12-20

## 2023-12-20 ENCOUNTER — APPOINTMENT (OUTPATIENT)
Dept: SURGICAL ONCOLOGY | Facility: HOSPITAL | Age: 73
End: 2023-12-20

## 2023-12-20 ENCOUNTER — INPATIENT (INPATIENT)
Facility: HOSPITAL | Age: 73
LOS: 0 days | Discharge: HOME | DRG: 399 | End: 2023-12-20
Attending: SURGERY | Admitting: SURGERY
Payer: COMMERCIAL

## 2023-12-20 VITALS
RESPIRATION RATE: 20 BRPM | DIASTOLIC BLOOD PRESSURE: 81 MMHG | HEART RATE: 84 BPM | OXYGEN SATURATION: 100 % | SYSTOLIC BLOOD PRESSURE: 127 MMHG | WEIGHT: 201.94 LBS | HEIGHT: 71 IN | TEMPERATURE: 98 F

## 2023-12-20 VITALS
SYSTOLIC BLOOD PRESSURE: 124 MMHG | DIASTOLIC BLOOD PRESSURE: 60 MMHG | HEART RATE: 69 BPM | OXYGEN SATURATION: 100 % | RESPIRATION RATE: 18 BRPM

## 2023-12-20 DIAGNOSIS — K38.9 DISEASE OF APPENDIX, UNSPECIFIED: ICD-10-CM

## 2023-12-20 DIAGNOSIS — Z96.652 PRESENCE OF LEFT ARTIFICIAL KNEE JOINT: Chronic | ICD-10-CM

## 2023-12-20 DIAGNOSIS — Z98.89 OTHER SPECIFIED POSTPROCEDURAL STATES: Chronic | ICD-10-CM

## 2023-12-20 LAB
GLUCOSE BLDC GLUCOMTR-MCNC: 101 MG/DL — HIGH (ref 70–99)
GLUCOSE BLDC GLUCOMTR-MCNC: 101 MG/DL — HIGH (ref 70–99)

## 2023-12-20 PROCEDURE — S2900: CPT

## 2023-12-20 PROCEDURE — S2900 ROBOTIC SURGICAL SYSTEM: CPT | Mod: NC

## 2023-12-20 PROCEDURE — C9399: CPT

## 2023-12-20 PROCEDURE — 44970 LAPAROSCOPY APPENDECTOMY: CPT

## 2023-12-20 PROCEDURE — 88304 TISSUE EXAM BY PATHOLOGIST: CPT

## 2023-12-20 PROCEDURE — 82962 GLUCOSE BLOOD TEST: CPT

## 2023-12-20 PROCEDURE — C1889: CPT

## 2023-12-20 PROCEDURE — 88304 TISSUE EXAM BY PATHOLOGIST: CPT | Mod: 26

## 2023-12-20 DEVICE — STAPLER COVIDIEN TRI-STAPLE 45MM PURPLE RELOAD: Type: IMPLANTABLE DEVICE | Status: FUNCTIONAL

## 2023-12-20 DEVICE — STAPLER COVIDIEN TRI-STAPLE 45MM TAN RELOAD: Type: IMPLANTABLE DEVICE | Status: FUNCTIONAL

## 2023-12-20 RX ORDER — FENTANYL CITRATE 50 UG/ML
25 INJECTION INTRAVENOUS
Refills: 0 | Status: DISCONTINUED | OUTPATIENT
Start: 2023-12-20 | End: 2023-12-20

## 2023-12-20 RX ORDER — APIXABAN 2.5 MG/1
1 TABLET, FILM COATED ORAL
Qty: 0 | Refills: 0 | DISCHARGE

## 2023-12-20 RX ORDER — ROSUVASTATIN CALCIUM 5 MG/1
1 TABLET ORAL
Qty: 0 | Refills: 0 | DISCHARGE

## 2023-12-20 RX ORDER — ALUMINUM ZIRCONIUM TRICHLOROHYDREX GLY 0.2 G/G
1 STICK TOPICAL
Qty: 0 | Refills: 0 | DISCHARGE

## 2023-12-20 RX ORDER — OXYCODONE HYDROCHLORIDE 5 MG/1
5 TABLET ORAL ONCE
Refills: 0 | Status: DISCONTINUED | OUTPATIENT
Start: 2023-12-20 | End: 2023-12-20

## 2023-12-20 RX ORDER — ACETAMINOPHEN 500 MG
975 TABLET ORAL ONCE
Refills: 0 | Status: COMPLETED | OUTPATIENT
Start: 2023-12-20 | End: 2023-12-20

## 2023-12-20 RX ORDER — ONDANSETRON 8 MG/1
4 TABLET, FILM COATED ORAL ONCE
Refills: 0 | Status: DISCONTINUED | OUTPATIENT
Start: 2023-12-20 | End: 2023-12-20

## 2023-12-20 RX ORDER — SODIUM CHLORIDE 9 MG/ML
1000 INJECTION, SOLUTION INTRAVENOUS
Refills: 0 | Status: DISCONTINUED | OUTPATIENT
Start: 2023-12-20 | End: 2023-12-20

## 2023-12-20 RX ADMIN — Medication 975 MILLIGRAM(S): at 11:03

## 2023-12-20 RX ADMIN — Medication 2000 MILLIGRAM(S): at 13:00

## 2023-12-20 RX ADMIN — HEPARIN SODIUM 5000 UNIT(S): 5000 INJECTION INTRAVENOUS; SUBCUTANEOUS at 13:00

## 2023-12-20 NOTE — ASU DISCHARGE PLAN (ADULT/PEDIATRIC) - B. DRINK ALCOHOL, BEER, OR WINE
· Right-sided hydroureteronephrosis, status post right-sided nephrostomy tube in May 2022 Statement Selected

## 2023-12-20 NOTE — ASU DISCHARGE PLAN (ADULT/PEDIATRIC) - NS MD DC FALL RISK RISK
For information on Fall & Injury Prevention, visit: [email or web address] OR  [email or web address] OR  https://www.cdc.gov/steadi/patient.html For information on Fall & Injury Prevention, visit: [email or web address] OR  [email or web address] OR  https://www.cdc.gov/steadi/patient.html

## 2023-12-20 NOTE — ASU DISCHARGE PLAN (ADULT/PEDIATRIC) - CARE PROVIDER_API CALL
Obie Carcamo  Surgery  [address] [place] [id number]  Phone: [phone]  Fax: [phone]  Follow Up Time:    Obie Carcamo  Surgery  [address] [place] [id number]  Phone: [phone]  Fax: [phone]  Follow Up Time:

## 2023-12-20 NOTE — BRIEF OPERATIVE NOTE - OPERATION/FINDINGS
robot-assisted appendectomy, staple across base of cecum with endoGIA 45mm purple staple load, staple across mesoappendix with endoGIA 45mm gold staple load

## 2023-12-31 LAB
SURGICAL PATHOLOGY STUDY: SIGNIFICANT CHANGE UP
SURGICAL PATHOLOGY STUDY: SIGNIFICANT CHANGE UP

## 2024-01-08 PROBLEM — Z09 SURGICAL FOLLOW-UP CARE: Status: ACTIVE | Noted: 2024-01-08

## 2024-01-11 ENCOUNTER — APPOINTMENT (OUTPATIENT)
Dept: SURGICAL ONCOLOGY | Facility: CLINIC | Age: 74
End: 2024-01-11

## 2024-01-11 VITALS
OXYGEN SATURATION: 97 % | HEIGHT: 71 IN | BODY MASS INDEX: 28 KG/M2 | HEART RATE: 62 BPM | WEIGHT: 200 LBS | SYSTOLIC BLOOD PRESSURE: 152 MMHG | DIASTOLIC BLOOD PRESSURE: 81 MMHG | TEMPERATURE: 98.4 F

## 2024-01-11 DIAGNOSIS — Z09 ENCOUNTER FOR FOLLOW-UP EXAMINATION AFTER COMPLETED TREATMENT FOR CONDITIONS OTHER THAN MALIGNANT NEOPLASM: ICD-10-CM

## 2024-01-13 NOTE — PHYSICAL EXAM
[Normal] : supple, no neck mass and thyroid not enlarged [Normal Neck Lymph Nodes] : normal neck lymph nodes  [Normal Supraclavicular Lymph Nodes] : normal supraclavicular lymph nodes [Normal Groin Lymph Nodes] : normal groin lymph nodes [Normal Axillary Lymph Nodes] : normal axillary lymph nodes [Normal] : oriented to person, place and time, with appropriate affect [de-identified] : Soft, non-tender non-distended.  No rebound, no guarding, no rigidity. No peritoneal signs. No masses.  [de-identified] : surgical incioincision sites

## 2024-01-13 NOTE — HISTORY OF PRESENT ILLNESS
[de-identified] : Mr. RYAN TURCIOS is a 72 year old male who presents with an enlarged appendix up to 9 mm with distal tapers. Referred by Dr. Alejandre with [facility].    PMHx: aortic stenosis; CAD; DVT; Hyperthyroidism; obesity; BPH; blood clots; GERD; HTN FMHx: Unspecified - heart disease SHx: CABG; Hernia repair; Left hip replacement SocHx: former smoker  CT Chest/Abd 10/16/2023: IMPRESSION: - Appendix is prominent proximally, measuring up to 9 mm and tapers distally - No adjacent inflammatory changes - No evidence to suggest acute appendicitis   - 11/02/2023: Patient presents today for initial consultation regarding enlarged appendix. CT was obtained to investigate the patient's unintentional weight loss. Complained of 15-20 unintentional weight loss, over the course of past few years. Denies F/C/N/V/D or abdominal pain. Of note, the patient feels he has an extensive surgical history and expresses hesitation with surgical excision.    INTERVAL HISTORY ***SURGERY***12/20/23 Robot-assisted appendectomy ***PATHOLOGY*** Specimen(s) Submitted 1  Appendix Final Diagnosis Appendix , appendectomy - Submucosal fibrosis and vascular ectasia, proximal appendix - Fibrous obliteration of lumen, distal appendix - Lymph node x 5, unremarkable - Calcified arteriosclerosis - No mural acute inflammation , atypia, or malignancy identified  01/11/24- Pt present today for initial post-op visit S/P Robot-assisted appendectomy on 12/20/23. Is clinically doing well with no CC or S/S.

## 2024-01-13 NOTE — ASSESSMENT
[FreeTextEntry1] : IMPRESSION: Mr. RYAN TURCIOS is a 72 year old male who presents with an enlarged appendix up to 9 mm with distal tapers. Referred by Dr. Alejandre with [facility].  CT Chest/Abd 10/16/2023: IMPRESSION: - Appendix is prominent proximally, measuring up to 9 mm and tapers distally - No adjacent inflammatory changes - No evidence to suggest acute appendicitis  ***SURGERY***12/20/23 Robot-assisted appendectomy ***PATHOLOGY*** Specimen(s) Submitted 1  Appendix Final Diagnosis Appendix , appendectomy - Submucosal fibrosis and vascular ectasia, proximal appendix - Fibrous obliteration of lumen, distal appendix - Lymph node x 5, unremarkable - Calcified arteriosclerosis - No mural acute inflammation , atypia, or malignancy identified   ASSESSMENT:  Pt present today for initial post-op visit S/P Robot-assisted appendectomy on 12/20/23. Is clinically doing well with no CC or S/S.  PLAN: 1) Continue care with GI  2) No further surgical Intervention needed

## 2024-01-13 NOTE — ASSESSMENT
[FreeTextEntry1] : IMPRESSION: Mr. RYAN TURCIOS is a 72 year old male who presents with an enlarged appendix up to 9 mm with distal tapers. Referred by Dr. Alejandre with [facility].  CT Chest/Abd 10/16/2023: IMPRESSION: - Appendix is prominent proximally, measuring up to 9 mm and tapers distally - No adjacent inflammatory changes - No evidence to suggest acute appendicitis  ***SURGERY***12/20/23 Robot-assisted appendectomy ***PATHOLOGY*** Specimen(s) Submitted 1  Appendix Final Diagnosis Appendix , appendectomy - Submucosal fibrosis and vascular ectasia, proximal appendix - Fibrous obliteration of lumen, distal appendix - Lymph node x 5, unremarkable - Calcified arteriosclerosis - No mural acute inflammation , atypia, or malignancy identified   ASSESSMENT:  Pt present today for initial post-op visit S/P Robot-assisted appendectomy on 12/20/23. Is clinically doing well with no CC or S/S.  PLAN: 1) Continue care with GI  2) No further surgical Intervention needed

## 2024-01-13 NOTE — HISTORY OF PRESENT ILLNESS
[de-identified] : Mr. RYAN TURCIOS is a 72 year old male who presents with an enlarged appendix up to 9 mm with distal tapers. Referred by Dr. Alejandre with [facility].    PMHx: aortic stenosis; CAD; DVT; Hyperthyroidism; obesity; BPH; blood clots; GERD; HTN FMHx: Unspecified - heart disease SHx: CABG; Hernia repair; Left hip replacement SocHx: former smoker  CT Chest/Abd 10/16/2023: IMPRESSION: - Appendix is prominent proximally, measuring up to 9 mm and tapers distally - No adjacent inflammatory changes - No evidence to suggest acute appendicitis   - 11/02/2023: Patient presents today for initial consultation regarding enlarged appendix. CT was obtained to investigate the patient's unintentional weight loss. Complained of 15-20 unintentional weight loss, over the course of past few years. Denies F/C/N/V/D or abdominal pain. Of note, the patient feels he has an extensive surgical history and expresses hesitation with surgical excision.    INTERVAL HISTORY ***SURGERY***12/20/23 Robot-assisted appendectomy ***PATHOLOGY*** Specimen(s) Submitted 1  Appendix Final Diagnosis Appendix , appendectomy - Submucosal fibrosis and vascular ectasia, proximal appendix - Fibrous obliteration of lumen, distal appendix - Lymph node x 5, unremarkable - Calcified arteriosclerosis - No mural acute inflammation , atypia, or malignancy identified  01/11/24- Pt present today for initial post-op visit S/P Robot-assisted appendectomy on 12/20/23. Is clinically doing well with no CC or S/S.

## 2024-01-13 NOTE — PHYSICAL EXAM
[Normal] : supple, no neck mass and thyroid not enlarged [Normal Neck Lymph Nodes] : normal neck lymph nodes  [Normal Supraclavicular Lymph Nodes] : normal supraclavicular lymph nodes [Normal Groin Lymph Nodes] : normal groin lymph nodes [Normal Axillary Lymph Nodes] : normal axillary lymph nodes [Normal] : oriented to person, place and time, with appropriate affect [de-identified] : Soft, non-tender non-distended.  No rebound, no guarding, no rigidity. No peritoneal signs. No masses.  [de-identified] : surgical incioincision sites

## 2024-02-11 ENCOUNTER — RESULT CHARGE (OUTPATIENT)
Age: 74
End: 2024-02-11

## 2024-02-14 ENCOUNTER — APPOINTMENT (OUTPATIENT)
Dept: CARDIOLOGY | Facility: CLINIC | Age: 74
End: 2024-02-14
Payer: MEDICARE

## 2024-02-14 ENCOUNTER — NON-APPOINTMENT (OUTPATIENT)
Age: 74
End: 2024-02-14

## 2024-02-14 VITALS
SYSTOLIC BLOOD PRESSURE: 118 MMHG | HEART RATE: 63 BPM | DIASTOLIC BLOOD PRESSURE: 78 MMHG | WEIGHT: 201 LBS | HEIGHT: 71 IN | BODY MASS INDEX: 28.14 KG/M2 | RESPIRATION RATE: 16 BRPM

## 2024-02-14 DIAGNOSIS — Z01.818 ENCOUNTER FOR OTHER PREPROCEDURAL EXAMINATION: ICD-10-CM

## 2024-02-14 DIAGNOSIS — I65.29 OCCLUSION AND STENOSIS OF UNSPECIFIED CAROTID ARTERY: ICD-10-CM

## 2024-02-14 PROCEDURE — 93000 ELECTROCARDIOGRAM COMPLETE: CPT

## 2024-02-14 PROCEDURE — 99213 OFFICE O/P EST LOW 20 MIN: CPT

## 2024-02-14 NOTE — PHYSICAL EXAM
[Well Developed] : well developed [Well Nourished] : well nourished [No Acute Distress] : no acute distress [Normal Conjunctiva] : normal conjunctiva [Normal Venous Pressure] : normal venous pressure [No Carotid Bruit] : no carotid bruit [Normal S1, S2] : normal S1, S2 [No Rub] : no rub [No Gallop] : no gallop [Clear Lung Fields] : clear lung fields [Good Air Entry] : good air entry [No Respiratory Distress] : no respiratory distress  [Soft] : abdomen soft [Non Tender] : non-tender [No Masses/organomegaly] : no masses/organomegaly [Normal Gait] : normal gait [No Edema] : no edema [No Cyanosis] : no cyanosis [No Clubbing] : no clubbing [No Rash] : no rash [No Skin Lesions] : no skin lesions [Moves all extremities] : moves all extremities [No Focal Deficits] : no focal deficits [Normal Speech] : normal speech [Alert and Oriented] : alert and oriented [Normal memory] : normal memory [de-identified] : Grade II/VI to III/VI systolic ejection murmur increased at aortic focus; A-2 preserved.

## 2024-02-14 NOTE — HISTORY OF PRESENT ILLNESS
[FreeTextEntry1] : During the summer last year, patient was cleared to undergo total left knee replacement surgery which apparently went well in August. 2023; Apparently, he consulted his Vascular Surgeon (Dr. Zenon Do) in regards to his  knee replacement surgery.  Dr. Do recommended he first undergo an IVC Filter placement prior to his orthopedic surgery for which she did, and now being monitored by the vascular surgeon;  Also, successfully underwent colonoscopy / endoscopy with additional successful pancreas resection as well.     He's been tolerating his current medical regimen without difficulty;  Most recent Echocardiogram from 03/14/2023 showed mild LVH with preserved LVEF at 55 to 60%. Mild diastolic dysfunction. Enlarged left atrium and right atrium. Moderate RV enlargement. Moderate to severe aortic stenosis with AoV Area (0.94 cm^2, was 1.30 cm^2) and AoV mean PG (17.0 mmHg, was 15.0 mmHg). Mild ascending aorta dilation stable at (4.1 cm). There is mild AI and mild to moderate TR, trace PI and mild elevated PA pressures;  Carotid duplex study from February 2022 shows minimal calcified and heterogeneous plaquing on the left side and mild calcified heterogeneous plaque on the right side. Otherwise normal flow;  Last nuclear stress test was from 2021 and demonstrated some moderately fixed perfusion defects in the inferior basilar wall of the left ventricle with preserved left ventricular systolic ejection fraction and no significant ischemia noted;

## 2024-02-14 NOTE — ASSESSMENT
[FreeTextEntry1] : EKG demonstrates sinus rhythm at a rate of 63.  There is a left axis deviation.  Slight left atrial enlargement.  Nonspecific T wave abnormality.  And otherwise no acute changes.   In summary, the patient is a 73-year-old gentleman who has a known history for CAD and remote double vessel CABG with additional PCI/stent last in September 2020 (left circumflex vessel), and who also has a history of heart murmur of moderate aortic stenosis being monitored on a periodic basis with echocardiography.;  He is now facing IVC filter removal procedure after being status post total left knee replacement surgery and IVC filter placed prior to that surgery this past summer and has convalesced well.;  Fortunately, the patient has had a stable cardiac pattern and has been hemodynamically stable on current medical regimen; There has been some weight loss recently for which additional GI procedures were completed for upper and lower GI endoscopy/colonoscopy with additional pancreatic resection being successful as well.      Plan:  Based on the patient's otherwise stable cardiac pattern, there is no absolute cardiac contraindication for the proposed IVC filter removal procedure.  He may hold the clopidogrel for 5 to 7 days prior to the procedure and may hold Eliquis 48 to 72 hours prior to the procedure and resume thereafter when deemed safe;  Recommend cardiac precautions with perioperative cardiac monitoring until stable;  Return to our office with Dr. Garzon on March 21st or as needed.   If I may be of additional assistance, please feel free to call.

## 2024-02-14 NOTE — REASON FOR VISIT
[FreeTextEntry1] : RYAN TURCIOS is being seen for arrhythmia/ECG abnormalities, structural heart and valve disease, hyperlipidemia, coronary artery disease and carotid, aortic and peripheral vascular disease.   The patient is a 73-year-old white male who has a history for CAD, remote CABG x2 vessels and prior PCI/stent (most recent September 2020-circumflex. One occluded graft found); Also heart murmur of moderate aortic stenosis as also found on last echocardiogram;  He returns to the office today for general cardiac checkup, and now in need of cardiac clearance regarding upcoming IVC filter removal procedure, with Dr. Zenon Do.  This is tentatively scheduled to be done next week on February 20th at [facility].    He has had a stable cardiac pattern without chest pain, shortness of breath, palpitations, dizziness or syncope;

## 2024-03-21 ENCOUNTER — APPOINTMENT (OUTPATIENT)
Dept: CARDIOLOGY | Facility: CLINIC | Age: 74
End: 2024-03-21
Payer: MEDICARE

## 2024-03-21 ENCOUNTER — NON-APPOINTMENT (OUTPATIENT)
Age: 74
End: 2024-03-21

## 2024-03-21 VITALS
BODY MASS INDEX: 28.28 KG/M2 | WEIGHT: 202 LBS | DIASTOLIC BLOOD PRESSURE: 68 MMHG | SYSTOLIC BLOOD PRESSURE: 118 MMHG | HEIGHT: 71 IN | HEART RATE: 70 BPM | RESPIRATION RATE: 16 BRPM

## 2024-03-21 DIAGNOSIS — I25.10 ATHEROSCLEROTIC HEART DISEASE OF NATIVE CORONARY ARTERY W/OUT ANGINA PECTORIS: ICD-10-CM

## 2024-03-21 DIAGNOSIS — I25.9 CHRONIC ISCHEMIC HEART DISEASE, UNSPECIFIED: ICD-10-CM

## 2024-03-21 DIAGNOSIS — Z86.718 PERSONAL HISTORY OF OTHER VENOUS THROMBOSIS AND EMBOLISM: ICD-10-CM

## 2024-03-21 DIAGNOSIS — I38 ENDOCARDITIS, VALVE UNSPECIFIED: ICD-10-CM

## 2024-03-21 DIAGNOSIS — I35.0 NONRHEUMATIC AORTIC (VALVE) STENOSIS: ICD-10-CM

## 2024-03-21 PROCEDURE — 93000 ELECTROCARDIOGRAM COMPLETE: CPT

## 2024-03-21 PROCEDURE — G2211 COMPLEX E/M VISIT ADD ON: CPT

## 2024-03-21 PROCEDURE — 99214 OFFICE O/P EST MOD 30 MIN: CPT

## 2024-03-21 NOTE — REASON FOR VISIT
[FreeTextEntry1] : RYAN TURCIOS is being seen for arrhythmia/ECG abnormalities, structural heart and valve disease, hyperlipidemia, coronary artery disease and carotid, aortic and peripheral vascular disease.   The patient is a 73-year-old white male who has a history for CAD, remote CABG x2 vessels and prior PCI/stent (most recent September 2020-circumflex. One occluded graft found); Also heart murmur of moderate aortic stenosis as also found on last echocardiogram;  He returns to the office today for general cardiac checkup;  Last month, patient was cleared to undergo IVC filter removal procedure, with Dr. Zenon Do.  This apparently went well;  Presently, he reports he has been feeling well and has had no significant chest pain, shortness of breath, palpitations or dizziness;

## 2024-03-21 NOTE — PHYSICAL EXAM
[Well Developed] : well developed [Well Nourished] : well nourished [No Acute Distress] : no acute distress [Normal Conjunctiva] : normal conjunctiva [Normal Venous Pressure] : normal venous pressure [No Carotid Bruit] : no carotid bruit [No Rub] : no rub [Normal S1, S2] : normal S1, S2 [No Gallop] : no gallop [Clear Lung Fields] : clear lung fields [Good Air Entry] : good air entry [No Respiratory Distress] : no respiratory distress  [Soft] : abdomen soft [Non Tender] : non-tender [No Masses/organomegaly] : no masses/organomegaly [Normal Gait] : normal gait [No Cyanosis] : no cyanosis [No Edema] : no edema [No Rash] : no rash [No Clubbing] : no clubbing [No Skin Lesions] : no skin lesions [Moves all extremities] : moves all extremities [No Focal Deficits] : no focal deficits [Normal Speech] : normal speech [Alert and Oriented] : alert and oriented [Normal memory] : normal memory [de-identified] : Grade II/VI to III/VI systolic ejection murmur increased at aortic focus; A-2 preserved.

## 2024-03-21 NOTE — ASSESSMENT
[FreeTextEntry1] : EKG demonstrates sinus rhythm at a rate of 70.  There is a left axis deviation.   Otherwise, appears within normal limits;   In summary, the patient is a 73-year-old gentleman who has a known history for CAD and remote double vessel CABG with additional PCI/stent last in September 2020 (left circumflex vessel), and who also has a history of heart murmur of moderate aortic stenosis being monitored on a periodic basis with echocardiography.;  More recently he had removal of an IVC filter and last year appendectomy and lymph node resection which was negative;  Fortunately, the patient has had a stable cardiac pattern and has been hemodynamically stable on current medical regimen; He has been keeping physically active and watching his diet better and exercising regularly without difficulty;    Plan:  Will continue to monitor patient clinically and periodically with carotid duplex study and echocardiogram for history of carotid endarterectomy on the right and moderate aortic stenosis and heart murmur; no known prior history of coronary artery disease and prior remote history for CABG and PCI's-(last in 2022 circumflex vessel;)  Echocardiogram and carotid duplex study prior to next visit within 5 to 6 months;  Continue current medical regimen;  Encouraged to continue moderate physical activity and exercise as tolerated;  Regular checkups and laboratory blood test with primary care encouraged;

## 2024-03-21 NOTE — HISTORY OF PRESENT ILLNESS
[FreeTextEntry1] : Last year, patient was cleared to undergo total left knee replacement surgery which apparently went well in August. 2023;  In addition, last year he underwent GI workup and surgery with additional successful appendix resection and lymph nodes which all tested negative as well;    He's been tolerating his current medical regimen without difficulty;  Most recent Echocardiogram from 03/14/2023 showed mild LVH with preserved LVEF at 55 to 60%. Mild diastolic dysfunction. Enlarged left atrium and right atrium. Moderate RV enlargement. Moderate to severe aortic stenosis with AoV Area (0.94 cm^2, was 1.30 cm^2) and AoV mean PG (17.0 mmHg, was 15.0 mmHg). Mild ascending aorta dilation stable at (4.1 cm). There is mild AI and mild to moderate TR, trace PI and mild elevated PA pressures;  Carotid duplex study from February 2022 shows minimal calcified and heterogeneous plaquing on the left side and mild calcified heterogeneous plaque on the right side. Otherwise normal flow;  Last nuclear stress test was from 2021 and demonstrated some moderately fixed perfusion defects in the inferior basilar wall of the left ventricle with preserved left ventricular systolic ejection fraction and no significant ischemia noted;

## 2024-03-26 ENCOUNTER — OFFICE (OUTPATIENT)
Dept: URBAN - METROPOLITAN AREA CLINIC 115 | Facility: CLINIC | Age: 74
Setting detail: OPHTHALMOLOGY
End: 2024-03-26
Payer: MEDICARE

## 2024-03-26 DIAGNOSIS — H01.011: ICD-10-CM

## 2024-03-26 DIAGNOSIS — H43.812: ICD-10-CM

## 2024-03-26 DIAGNOSIS — Z96.1: ICD-10-CM

## 2024-03-26 DIAGNOSIS — H01.012: ICD-10-CM

## 2024-03-26 DIAGNOSIS — H01.015: ICD-10-CM

## 2024-03-26 DIAGNOSIS — H35.033: ICD-10-CM

## 2024-03-26 DIAGNOSIS — H01.014: ICD-10-CM

## 2024-03-26 PROCEDURE — 92014 COMPRE OPH EXAM EST PT 1/>: CPT | Performed by: OPHTHALMOLOGY

## 2024-03-26 ASSESSMENT — LID EXAM ASSESSMENTS
OS_BLEPHARITIS: LLL LUL T
OD_BLEPHARITIS: RLL RUL T

## 2024-03-26 ASSESSMENT — REFRACTION_MANIFEST
OS_CYLINDER: -1.00
OS_SPHERE: +0.50
OS_VA1: 20/20
OS_SPHERE: +2.75
OD_AXIS: 090
OD_CYLINDER: -0.50
OS_AXIS: 150
OS_AXIS: 150
OD_AXIS: 090
OD_ADD: +2.25
OD_SPHERE: +2.75
OS_CYLINDER: -1.00
OD_SPHERE: +0.50
OD_CYLINDER: -0.50
OS_ADD: +2.25
OD_VA1: 20/20

## 2024-03-26 ASSESSMENT — REFRACTION_CURRENTRX
OD_SPHERE: +2.25
OS_OVR_VA: 20/
OS_SPHERE: +2.25
OD_OVR_VA: 20/

## 2024-03-26 ASSESSMENT — SPHEQUIV_DERIVED
OD_SPHEQUIV: 0.25
OS_SPHEQUIV: 2.25
OD_SPHEQUIV: 2.5
OS_SPHEQUIV: 0

## 2024-05-06 RX ORDER — CLOPIDOGREL BISULFATE 75 MG/1
75 TABLET, FILM COATED ORAL
Qty: 90 | Refills: 1 | Status: ACTIVE | COMMUNITY
Start: 2021-09-15 | End: 1900-01-01

## 2024-06-27 ENCOUNTER — APPOINTMENT (OUTPATIENT)
Dept: SURGERY | Facility: CLINIC | Age: 74
End: 2024-06-27
Payer: MEDICARE

## 2024-06-27 VITALS
DIASTOLIC BLOOD PRESSURE: 81 MMHG | SYSTOLIC BLOOD PRESSURE: 131 MMHG | HEIGHT: 68 IN | TEMPERATURE: 98.6 F | RESPIRATION RATE: 16 BRPM | BODY MASS INDEX: 30.16 KG/M2 | HEART RATE: 71 BPM | OXYGEN SATURATION: 97 % | WEIGHT: 199 LBS

## 2024-06-27 DIAGNOSIS — K40.91 UNILATERAL INGUINAL HERNIA, W/OUT OBSTRUCTION OR GANGRENE, RECURRENT: ICD-10-CM

## 2024-06-27 DIAGNOSIS — K43.2 INCISIONAL HERNIA W/OUT OBSTRUCTION OR GANGRENE: ICD-10-CM

## 2024-06-27 PROCEDURE — 99213 OFFICE O/P EST LOW 20 MIN: CPT

## 2024-08-22 ENCOUNTER — APPOINTMENT (OUTPATIENT)
Dept: CARDIOLOGY | Facility: CLINIC | Age: 74
End: 2024-08-22

## 2024-08-22 ENCOUNTER — APPOINTMENT (OUTPATIENT)
Dept: CARDIOLOGY | Facility: CLINIC | Age: 74
End: 2024-08-22
Payer: MEDICARE

## 2024-08-22 PROCEDURE — 93306 TTE W/DOPPLER COMPLETE: CPT

## 2024-08-22 PROCEDURE — 93880 EXTRACRANIAL BILAT STUDY: CPT

## 2024-09-05 ENCOUNTER — APPOINTMENT (OUTPATIENT)
Dept: CARDIOLOGY | Facility: CLINIC | Age: 74
End: 2024-09-05
Payer: MEDICARE

## 2024-09-05 VITALS
DIASTOLIC BLOOD PRESSURE: 74 MMHG | WEIGHT: 196 LBS | RESPIRATION RATE: 16 BRPM | BODY MASS INDEX: 29.7 KG/M2 | HEIGHT: 68 IN | HEART RATE: 58 BPM | SYSTOLIC BLOOD PRESSURE: 134 MMHG

## 2024-09-05 DIAGNOSIS — I25.9 CHRONIC ISCHEMIC HEART DISEASE, UNSPECIFIED: ICD-10-CM

## 2024-09-05 DIAGNOSIS — I25.10 ATHEROSCLEROTIC HEART DISEASE OF NATIVE CORONARY ARTERY W/OUT ANGINA PECTORIS: ICD-10-CM

## 2024-09-05 DIAGNOSIS — R06.02 SHORTNESS OF BREATH: ICD-10-CM

## 2024-09-05 DIAGNOSIS — R94.39 ABNORMAL RESULT OF OTHER CARDIOVASCULAR FUNCTION STUDY: ICD-10-CM

## 2024-09-05 PROCEDURE — G2211 COMPLEX E/M VISIT ADD ON: CPT

## 2024-09-05 PROCEDURE — 93000 ELECTROCARDIOGRAM COMPLETE: CPT

## 2024-09-05 PROCEDURE — 99214 OFFICE O/P EST MOD 30 MIN: CPT

## 2024-09-05 NOTE — PHYSICAL EXAM
[Well Developed] : well developed [Well Nourished] : well nourished [No Acute Distress] : no acute distress [Normal Conjunctiva] : normal conjunctiva [Normal Venous Pressure] : normal venous pressure [No Carotid Bruit] : no carotid bruit [No Rub] : no rub [Normal S1, S2] : normal S1, S2 [No Gallop] : no gallop [Clear Lung Fields] : clear lung fields [Good Air Entry] : good air entry [No Respiratory Distress] : no respiratory distress  [Soft] : abdomen soft [Non Tender] : non-tender [No Masses/organomegaly] : no masses/organomegaly [Normal Gait] : normal gait [No Edema] : no edema [No Cyanosis] : no cyanosis [No Clubbing] : no clubbing [No Rash] : no rash [No Skin Lesions] : no skin lesions [Moves all extremities] : moves all extremities [No Focal Deficits] : no focal deficits [Normal Speech] : normal speech [Alert and Oriented] : alert and oriented [Normal memory] : normal memory [de-identified] : Grade II/VI to III/VI systolic ejection murmur increased at aortic focus; A-2 preserved.

## 2024-09-05 NOTE — HISTORY OF PRESENT ILLNESS
[FreeTextEntry1] : More recently, patient has been having difficulty again with his left hip which was previously operated on a few times last being in 2012.  After recently seeing the orthopedic surgeon-Dr. De Luna, it was recommended he will need another revision of left hip surgery tentatively scheduled for November 11, 2024 at [facility];   Last year, patient was cleared to undergo total left knee replacement surgery which apparently went well in August. 2023;  In addition, last year he underwent GI workup and surgery with additional successful appendix resection and lymph nodes which all tested negative as well;   Overall, patient states that he has steadily lost some weight over the past year or so but no definite cause was found even though he states his appetite has lessened and could be the reason;   Carotid duplex study performed August 22, 2024 shows bilateral minimal carotid plaquing in the range of 1 to 19% without any significant obstructive disease;  Transthoracic echocardiogram completed August 22, 2024 demonstrating once again borderline LVH pattern with overall preserved systolic function and EF in the range of 59%.  There is normal biatrial dimensions.  There is mild dilatation of the ascending aorta measuring 4.0 cm.  There is at least moderate to borderline severe aortic stenosis with slightly increased peak aortic gradient of 44 mmHg mean gradient of 24 mmHg; no pericardial effusion; mild AI, mild to moderate TR, mild PI;    Echocardiogram from 03/14/2023 showed mild LVH with preserved LVEF at 55 to 60%. Mild diastolic dysfunction. Enlarged left atrium and right atrium. Moderate RV enlargement. Moderate to severe aortic stenosis with AoV Area (0.94 cm^2, was 1.30 cm^2) and AoV mean PG (17.0 mmHg, was 15.0 mmHg). Mild ascending aorta dilation stable at (4.1 cm). There is mild AI and mild to moderate TR, trace PI and mild elevated PA pressures;  Carotid duplex study from February 2022 shows minimal calcified and heterogeneous plaquing on the left side and mild calcified heterogeneous plaque on the right side. Otherwise normal flow;  Last nuclear stress test was from 2021 and demonstrated some moderately fixed perfusion defects in the inferior basilar wall of the left ventricle with preserved left ventricular systolic ejection fraction and no significant ischemia noted;

## 2024-09-05 NOTE — REVIEW OF SYSTEMS
[Joint Pain] : joint pain [Joint Stiffness] : joint stiffness [Hip Problem] : hip problems [Hip Pain] : hip pain [Negative] : Heme/Lymph

## 2024-09-05 NOTE — REASON FOR VISIT
[FreeTextEntry1] : RYAN TURCIOS is being seen for arrhythmia/ECG abnormalities, structural heart and valve disease, hyperlipidemia, coronary artery disease and carotid, aortic and peripheral vascular disease.   The patient is a 73-year-old white male who has a history for CAD, remote CABG x2 vessels and prior PCI/stent (most recent September 2020-circumflex. One occluded graft found); Also heart murmur of moderate aortic stenosis as also found on last echocardiogram;  He returns to the office today for general cardiac checkup and to review recent cardiovascular testing;  Presently, he reports he has been generally otherwise feeling well and has had no significant chest pain, shortness of breath, palpitations or dizziness;

## 2024-09-05 NOTE — ASSESSMENT
[FreeTextEntry1] : EKG demonstrates sinus rhythm at a rate of 58  There is a left axis deviation.  Otherwise, appears within normal limits;   In summary, the patient is a 73-year-old gentleman who has a known history for CAD and remote double vessel CABG with additional PCI/stent last in September 2020 (left circumflex vessel), and who also has a history of heart murmur of moderate aortic stenosis for which was also suspected clinically as well and for which he has been largely asymptomatic;   The patient has had a stable cardiac pattern and has been hemodynamically stable on current medical regimen; He has been keeping physically active and watching his diet better and exercising regularly without difficulty;    Plan:  Based on his findings currently, there does not appear to be any absolute cardiac contraindication for the future orthopedic surgical procedure of his left hip;  Continue current medical regimen; will continue to monitor patient clinically and on periodic echocardiograms in the future for his moderate aortic stenosis;  Encouraged to continue moderate physical activity and exercise as tolerated;  Regular checkups and laboratory blood test with primary care encouraged;  Patient to follow-up to office within 3 to 4 months or as needed;

## 2024-09-12 ENCOUNTER — APPOINTMENT (OUTPATIENT)
Dept: SURGERY | Facility: CLINIC | Age: 74
End: 2024-09-12

## 2024-10-04 ENCOUNTER — APPOINTMENT (OUTPATIENT)
Dept: DERMATOLOGY | Facility: CLINIC | Age: 74
End: 2024-10-04
Payer: MEDICARE

## 2024-10-04 PROCEDURE — 11900 INJECT SKIN LESIONS </W 7: CPT

## 2024-10-04 PROCEDURE — 99213 OFFICE O/P EST LOW 20 MIN: CPT | Mod: 25

## 2024-10-22 ENCOUNTER — NON-APPOINTMENT (OUTPATIENT)
Age: 74
End: 2024-10-22

## 2024-10-22 ENCOUNTER — APPOINTMENT (OUTPATIENT)
Dept: CARDIOLOGY | Facility: CLINIC | Age: 74
End: 2024-10-22
Payer: MEDICARE

## 2024-10-22 VITALS
SYSTOLIC BLOOD PRESSURE: 112 MMHG | DIASTOLIC BLOOD PRESSURE: 68 MMHG | WEIGHT: 195 LBS | HEIGHT: 68 IN | RESPIRATION RATE: 16 BRPM | HEART RATE: 55 BPM | BODY MASS INDEX: 29.55 KG/M2

## 2024-10-22 DIAGNOSIS — I25.10 ATHEROSCLEROTIC HEART DISEASE OF NATIVE CORONARY ARTERY W/OUT ANGINA PECTORIS: ICD-10-CM

## 2024-10-22 DIAGNOSIS — Z01.818 ENCOUNTER FOR OTHER PREPROCEDURAL EXAMINATION: ICD-10-CM

## 2024-10-22 DIAGNOSIS — I25.9 CHRONIC ISCHEMIC HEART DISEASE, UNSPECIFIED: ICD-10-CM

## 2024-10-22 DIAGNOSIS — I35.0 NONRHEUMATIC AORTIC (VALVE) STENOSIS: ICD-10-CM

## 2024-10-22 DIAGNOSIS — I65.29 OCCLUSION AND STENOSIS OF UNSPECIFIED CAROTID ARTERY: ICD-10-CM

## 2024-10-22 DIAGNOSIS — I38 ENDOCARDITIS, VALVE UNSPECIFIED: ICD-10-CM

## 2024-10-22 DIAGNOSIS — Z86.718 PERSONAL HISTORY OF OTHER VENOUS THROMBOSIS AND EMBOLISM: ICD-10-CM

## 2024-10-22 PROCEDURE — 93000 ELECTROCARDIOGRAM COMPLETE: CPT

## 2024-10-22 PROCEDURE — 99214 OFFICE O/P EST MOD 30 MIN: CPT

## 2024-10-29 ENCOUNTER — RX RENEWAL (OUTPATIENT)
Age: 74
End: 2024-10-29

## 2025-02-24 ENCOUNTER — APPOINTMENT (OUTPATIENT)
Dept: SURGERY | Facility: CLINIC | Age: 75
End: 2025-02-24
Payer: MEDICARE

## 2025-02-24 VITALS
TEMPERATURE: 98.2 F | HEIGHT: 68 IN | DIASTOLIC BLOOD PRESSURE: 74 MMHG | HEART RATE: 66 BPM | OXYGEN SATURATION: 98 % | WEIGHT: 197 LBS | RESPIRATION RATE: 16 BRPM | SYSTOLIC BLOOD PRESSURE: 134 MMHG | BODY MASS INDEX: 29.86 KG/M2

## 2025-02-24 DIAGNOSIS — K43.2 INCISIONAL HERNIA W/OUT OBSTRUCTION OR GANGRENE: ICD-10-CM

## 2025-02-24 DIAGNOSIS — K40.91 UNILATERAL INGUINAL HERNIA, W/OUT OBSTRUCTION OR GANGRENE, RECURRENT: ICD-10-CM

## 2025-02-24 PROCEDURE — 99213 OFFICE O/P EST LOW 20 MIN: CPT

## 2025-04-01 ENCOUNTER — APPOINTMENT (OUTPATIENT)
Dept: CARDIOLOGY | Facility: CLINIC | Age: 75
End: 2025-04-01

## 2025-04-01 ENCOUNTER — OFFICE (OUTPATIENT)
Dept: URBAN - METROPOLITAN AREA CLINIC 115 | Facility: CLINIC | Age: 75
Setting detail: OPHTHALMOLOGY
End: 2025-04-01
Payer: MEDICARE

## 2025-04-01 VITALS
DIASTOLIC BLOOD PRESSURE: 64 MMHG | WEIGHT: 193 LBS | HEIGHT: 68 IN | BODY MASS INDEX: 29.25 KG/M2 | HEART RATE: 61 BPM | SYSTOLIC BLOOD PRESSURE: 112 MMHG | RESPIRATION RATE: 16 BRPM

## 2025-04-01 DIAGNOSIS — H01.011: ICD-10-CM

## 2025-04-01 DIAGNOSIS — H43.812: ICD-10-CM

## 2025-04-01 DIAGNOSIS — I25.10 ATHEROSCLEROTIC HEART DISEASE OF NATIVE CORONARY ARTERY W/OUT ANGINA PECTORIS: ICD-10-CM

## 2025-04-01 DIAGNOSIS — I35.0 NONRHEUMATIC AORTIC (VALVE) STENOSIS: ICD-10-CM

## 2025-04-01 DIAGNOSIS — H01.012: ICD-10-CM

## 2025-04-01 DIAGNOSIS — H01.015: ICD-10-CM

## 2025-04-01 DIAGNOSIS — Z96.1: ICD-10-CM

## 2025-04-01 DIAGNOSIS — H35.033: ICD-10-CM

## 2025-04-01 DIAGNOSIS — H01.014: ICD-10-CM

## 2025-04-01 DIAGNOSIS — I65.29 OCCLUSION AND STENOSIS OF UNSPECIFIED CAROTID ARTERY: ICD-10-CM

## 2025-04-01 DIAGNOSIS — I25.9 CHRONIC ISCHEMIC HEART DISEASE, UNSPECIFIED: ICD-10-CM

## 2025-04-01 PROCEDURE — 92014 COMPRE OPH EXAM EST PT 1/>: CPT | Performed by: OPHTHALMOLOGY

## 2025-04-01 PROCEDURE — 92202 OPSCPY EXTND ON/MAC DRAW: CPT | Performed by: OPHTHALMOLOGY

## 2025-04-01 PROCEDURE — 92134 CPTRZ OPH DX IMG PST SGM RTA: CPT | Performed by: OPHTHALMOLOGY

## 2025-04-01 PROCEDURE — 99214 OFFICE O/P EST MOD 30 MIN: CPT

## 2025-04-01 ASSESSMENT — REFRACTION_MANIFEST
OS_CYLINDER: -1.00
OS_SPHERE: +2.75
OD_AXIS: 090
OD_CYLINDER: -0.50
OD_CYLINDER: -0.50
OS_SPHERE: +0.50
OS_AXIS: 150
OD_AXIS: 090
OS_CYLINDER: -1.00
OS_AXIS: 150
OD_ADD: +2.25
OS_VA1: 20/20
OD_SPHERE: +2.75
OS_ADD: +2.25
OD_VA1: 20/20
OD_SPHERE: +0.50

## 2025-04-01 ASSESSMENT — LID EXAM ASSESSMENTS
OD_BLEPHARITIS: RLL RUL T
OS_BLEPHARITIS: LLL LUL T

## 2025-04-01 ASSESSMENT — REFRACTION_AUTOREFRACTION
OD_CYLINDER: -0.50
OD_AXIS: 081
OS_AXIS: 152
OS_CYLINDER: -1.00
OS_SPHERE: +1.00
OD_SPHERE: +0.50

## 2025-04-01 ASSESSMENT — VISUAL ACUITY
OS_BCVA: 20/20
OD_BCVA: 20/20-1

## 2025-04-01 ASSESSMENT — TONOMETRY
OD_IOP_MMHG: 14
OS_IOP_MMHG: 17

## 2025-04-01 ASSESSMENT — CONFRONTATIONAL VISUAL FIELD TEST (CVF)
OS_FINDINGS: FULL
OD_FINDINGS: FULL

## 2025-04-01 ASSESSMENT — REFRACTION_CURRENTRX
OS_SPHERE: +2.25
OS_OVR_VA: 20/
OD_OVR_VA: 20/
OD_SPHERE: +2.25

## 2025-06-12 ENCOUNTER — APPOINTMENT (OUTPATIENT)
Dept: DERMATOLOGY | Facility: CLINIC | Age: 75
End: 2025-06-12
Payer: MEDICARE

## 2025-06-12 PROCEDURE — 99213 OFFICE O/P EST LOW 20 MIN: CPT

## 2025-06-23 ENCOUNTER — APPOINTMENT (OUTPATIENT)
Dept: SURGERY | Facility: CLINIC | Age: 75
End: 2025-06-23

## 2025-09-02 ENCOUNTER — NON-APPOINTMENT (OUTPATIENT)
Age: 75
End: 2025-09-02

## 2025-09-03 ENCOUNTER — APPOINTMENT (OUTPATIENT)
Dept: CARDIOLOGY | Facility: CLINIC | Age: 75
End: 2025-09-03
Payer: MEDICARE

## 2025-09-03 PROCEDURE — 78452 HT MUSCLE IMAGE SPECT MULT: CPT

## 2025-09-03 PROCEDURE — A9500: CPT

## 2025-09-03 PROCEDURE — 93015 CV STRESS TEST SUPVJ I&R: CPT

## 2025-09-03 RX ORDER — REGADENOSON 0.08 MG/ML
0.4 INJECTION, SOLUTION INTRAVENOUS
Refills: 0 | Status: COMPLETED | OUTPATIENT
Start: 2025-09-03

## 2025-09-03 RX ADMIN — REGADENOSON 0 MG/5ML: 0.08 INJECTION, SOLUTION INTRAVENOUS at 00:00

## 2025-09-15 ENCOUNTER — APPOINTMENT (OUTPATIENT)
Dept: CARDIOLOGY | Facility: CLINIC | Age: 75
End: 2025-09-15
Payer: MEDICARE

## 2025-09-15 VITALS
HEIGHT: 68 IN | DIASTOLIC BLOOD PRESSURE: 74 MMHG | SYSTOLIC BLOOD PRESSURE: 120 MMHG | BODY MASS INDEX: 29.55 KG/M2 | WEIGHT: 195 LBS

## 2025-09-15 DIAGNOSIS — I25.10 ATHEROSCLEROTIC HEART DISEASE OF NATIVE CORONARY ARTERY W/OUT ANGINA PECTORIS: ICD-10-CM

## 2025-09-15 DIAGNOSIS — I65.29 OCCLUSION AND STENOSIS OF UNSPECIFIED CAROTID ARTERY: ICD-10-CM

## 2025-09-15 DIAGNOSIS — I35.0 NONRHEUMATIC AORTIC (VALVE) STENOSIS: ICD-10-CM

## 2025-09-15 DIAGNOSIS — I25.9 CHRONIC ISCHEMIC HEART DISEASE, UNSPECIFIED: ICD-10-CM

## 2025-09-15 DIAGNOSIS — R94.39 ABNORMAL RESULT OF OTHER CARDIOVASCULAR FUNCTION STUDY: ICD-10-CM

## 2025-09-15 PROCEDURE — 99214 OFFICE O/P EST MOD 30 MIN: CPT

## 2025-09-15 PROCEDURE — G2211 COMPLEX E/M VISIT ADD ON: CPT

## (undated) DEVICE — SUT SILK 2-0 30" SH

## (undated) DEVICE — XI ARM GRASPER TIP UP FENESTRATED

## (undated) DEVICE — ELCTR GROUNDING PAD ADULT COVIDIEN

## (undated) DEVICE — ENDOCATCH 10MM SPECIMEN POUCH

## (undated) DEVICE — XI ARM PERMANENT CAUTERY SPATULA

## (undated) DEVICE — XI DRAPE COLUMN

## (undated) DEVICE — DRAPE TOWEL BLUE STICKY

## (undated) DEVICE — XI VESSEL SEALER

## (undated) DEVICE — GLV 7 PROTEXIS (WHITE)

## (undated) DEVICE — XI OBTURATOR OPTICAL BLADELESS 8MM

## (undated) DEVICE — TUBING CONNECTING 6MM 20FT

## (undated) DEVICE — SUT VICRYL 0 27" CT-1

## (undated) DEVICE — POSITIONER FOAM EGG CRATE ULNAR 2PCS (PINK)

## (undated) DEVICE — VENODYNE/SCD SLEEVE CALF MEDIUM

## (undated) DEVICE — DRSG STERISTRIPS 0.5 X 4"

## (undated) DEVICE — SOL IRR POUR H2O 1000ML

## (undated) DEVICE — STAPLER SKIN PROXIMATE

## (undated) DEVICE — XI CORD BIPOLAR CAUTERY (BLUE)

## (undated) DEVICE — XI SEAL UNIVERSIAL 5-12MM

## (undated) DEVICE — PACK ROBOTIC

## (undated) DEVICE — DRAPE IOBAN 23" X 17"

## (undated) DEVICE — TUBING AIRSEAL TRI-LUMEN FILTERED

## (undated) DEVICE — DRAPE INSTRUMENT POUCH 6.75" X 11"

## (undated) DEVICE — XI DRAPE ARM

## (undated) DEVICE — XI TIP COVER

## (undated) DEVICE — XI ARM NEEDLE DRIVER SUTURECUT MEGA 8MM

## (undated) DEVICE — TROCAR SURGIQUEST AIRSEAL 12MMX100MM

## (undated) DEVICE — BLADE SCALPEL SAFETYLOCK #10

## (undated) DEVICE — WARMING BLANKET UPPER ADULT

## (undated) DEVICE — ENDOCATCH II 15MM

## (undated) DEVICE — DRAPE TOWEL BLUE 17" X 24"

## (undated) DEVICE — PREP CHLORAPREP HI-LITE ORANGE 26ML

## (undated) DEVICE — SUT SILK 3-0 18" SH (POP-OFF)

## (undated) DEVICE — SUT PDS II 1 48" TP-1

## (undated) DEVICE — SOL IRR POUR NS 0.9% 1000ML

## (undated) DEVICE — STAPLER COVIDIEN ENDO GIA STANDARD HANDLE

## (undated) DEVICE — DRSG OPSITE 13.75 X 4"

## (undated) DEVICE — SUT MONOCRYL 4-0 27" PS-2 UNDYED

## (undated) DEVICE — GLV 7.5 PROTEXIS (WHITE)

## (undated) DEVICE — COVIDIEN SIGNIA POWER CONTROL SHELL

## (undated) DEVICE — TROCAR SURGIQUEST AIRSEAL 8MMX100MM

## (undated) DEVICE — XI CORD MONOPOLAR CAUTERY (GREEN)

## (undated) DEVICE — XI ARM CLIP APPLIER LARGE

## (undated) DEVICE — LUBRICANT INST ELECTROLUBE Z SOLUTION

## (undated) DEVICE — ELCTR BOVIE PENCIL SMOKE EVACUATION 15FT

## (undated) DEVICE — XI ARM CLIP APPLIER MEDIUM-LARGE

## (undated) DEVICE — GOWN TRIMAX LG

## (undated) DEVICE — SUT VICRYL 1 18" TIES UNDYED